# Patient Record
Sex: FEMALE | Race: OTHER | URBAN - METROPOLITAN AREA
[De-identification: names, ages, dates, MRNs, and addresses within clinical notes are randomized per-mention and may not be internally consistent; named-entity substitution may affect disease eponyms.]

---

## 2020-09-15 ENCOUNTER — INPATIENT (INPATIENT)
Facility: HOSPITAL | Age: 45
LOS: 2 days | Discharge: HOME | End: 2020-09-18
Attending: INTERNAL MEDICINE | Admitting: INTERNAL MEDICINE
Payer: COMMERCIAL

## 2020-09-15 VITALS
RESPIRATION RATE: 18 BRPM | TEMPERATURE: 98 F | OXYGEN SATURATION: 100 % | DIASTOLIC BLOOD PRESSURE: 57 MMHG | WEIGHT: 199.96 LBS | SYSTOLIC BLOOD PRESSURE: 121 MMHG | HEART RATE: 81 BPM

## 2020-09-15 LAB
ALBUMIN SERPL ELPH-MCNC: 4.4 G/DL — SIGNIFICANT CHANGE UP (ref 3.5–5.2)
ALP SERPL-CCNC: 47 U/L — SIGNIFICANT CHANGE UP (ref 30–115)
ALT FLD-CCNC: 10 U/L — SIGNIFICANT CHANGE UP (ref 0–41)
ANION GAP SERPL CALC-SCNC: 10 MMOL/L — SIGNIFICANT CHANGE UP (ref 7–14)
APPEARANCE UR: ABNORMAL
APTT BLD: 28.4 SEC — SIGNIFICANT CHANGE UP (ref 27–39.2)
AST SERPL-CCNC: 13 U/L — SIGNIFICANT CHANGE UP (ref 0–41)
BACTERIA # UR AUTO: ABNORMAL
BASOPHILS # BLD AUTO: 0.05 K/UL — SIGNIFICANT CHANGE UP (ref 0–0.2)
BASOPHILS NFR BLD AUTO: 0.8 % — SIGNIFICANT CHANGE UP (ref 0–1)
BILIRUB SERPL-MCNC: 0.3 MG/DL — SIGNIFICANT CHANGE UP (ref 0.2–1.2)
BILIRUB UR-MCNC: NEGATIVE — SIGNIFICANT CHANGE UP
BLD GP AB SCN SERPL QL: SIGNIFICANT CHANGE UP
BUN SERPL-MCNC: 13 MG/DL — SIGNIFICANT CHANGE UP (ref 10–20)
CALCIUM SERPL-MCNC: 9 MG/DL — SIGNIFICANT CHANGE UP (ref 8.5–10.1)
CHLORIDE SERPL-SCNC: 106 MMOL/L — SIGNIFICANT CHANGE UP (ref 98–110)
CO2 SERPL-SCNC: 26 MMOL/L — SIGNIFICANT CHANGE UP (ref 17–32)
COLOR SPEC: YELLOW — SIGNIFICANT CHANGE UP
CREAT SERPL-MCNC: 0.7 MG/DL — SIGNIFICANT CHANGE UP (ref 0.7–1.5)
DIFF PNL FLD: SIGNIFICANT CHANGE UP
EOSINOPHIL # BLD AUTO: 0.07 K/UL — SIGNIFICANT CHANGE UP (ref 0–0.7)
EOSINOPHIL NFR BLD AUTO: 1.1 % — SIGNIFICANT CHANGE UP (ref 0–8)
EPI CELLS # UR: 10 /HPF — HIGH (ref 0–5)
GLUCOSE SERPL-MCNC: 81 MG/DL — SIGNIFICANT CHANGE UP (ref 70–99)
GLUCOSE UR QL: NEGATIVE — SIGNIFICANT CHANGE UP
HCT VFR BLD CALC: 39.4 % — SIGNIFICANT CHANGE UP (ref 37–47)
HGB BLD-MCNC: 13 G/DL — SIGNIFICANT CHANGE UP (ref 12–16)
HYALINE CASTS # UR AUTO: 26 /LPF — HIGH (ref 0–7)
IMM GRANULOCYTES NFR BLD AUTO: 1.3 % — HIGH (ref 0.1–0.3)
INR BLD: 1.04 RATIO — SIGNIFICANT CHANGE UP (ref 0.65–1.3)
KETONES UR-MCNC: NEGATIVE — SIGNIFICANT CHANGE UP
LACTATE SERPL-SCNC: 1.1 MMOL/L — SIGNIFICANT CHANGE UP (ref 0.7–2)
LEUKOCYTE ESTERASE UR-ACNC: ABNORMAL
LIDOCAIN IGE QN: 38 U/L — SIGNIFICANT CHANGE UP (ref 7–60)
LYMPHOCYTES # BLD AUTO: 2.24 K/UL — SIGNIFICANT CHANGE UP (ref 1.2–3.4)
LYMPHOCYTES # BLD AUTO: 36.4 % — SIGNIFICANT CHANGE UP (ref 20.5–51.1)
MCHC RBC-ENTMCNC: 29.4 PG — SIGNIFICANT CHANGE UP (ref 27–31)
MCHC RBC-ENTMCNC: 33 G/DL — SIGNIFICANT CHANGE UP (ref 32–37)
MCV RBC AUTO: 89.1 FL — SIGNIFICANT CHANGE UP (ref 81–99)
MONOCYTES # BLD AUTO: 0.47 K/UL — SIGNIFICANT CHANGE UP (ref 0.1–0.6)
MONOCYTES NFR BLD AUTO: 7.6 % — SIGNIFICANT CHANGE UP (ref 1.7–9.3)
NEUTROPHILS # BLD AUTO: 3.25 K/UL — SIGNIFICANT CHANGE UP (ref 1.4–6.5)
NEUTROPHILS NFR BLD AUTO: 52.8 % — SIGNIFICANT CHANGE UP (ref 42.2–75.2)
NITRITE UR-MCNC: NEGATIVE — SIGNIFICANT CHANGE UP
NRBC # BLD: 0 /100 WBCS — SIGNIFICANT CHANGE UP (ref 0–0)
PH UR: 6.5 — SIGNIFICANT CHANGE UP (ref 5–8)
PLATELET # BLD AUTO: 253 K/UL — SIGNIFICANT CHANGE UP (ref 130–400)
POTASSIUM SERPL-MCNC: 3.8 MMOL/L — SIGNIFICANT CHANGE UP (ref 3.5–5)
POTASSIUM SERPL-SCNC: 3.8 MMOL/L — SIGNIFICANT CHANGE UP (ref 3.5–5)
PROT SERPL-MCNC: 6.6 G/DL — SIGNIFICANT CHANGE UP (ref 6–8)
PROT UR-MCNC: ABNORMAL
PROTHROM AB SERPL-ACNC: 12 SEC — SIGNIFICANT CHANGE UP (ref 9.95–12.87)
RBC # BLD: 4.42 M/UL — SIGNIFICANT CHANGE UP (ref 4.2–5.4)
RBC # FLD: 11.9 % — SIGNIFICANT CHANGE UP (ref 11.5–14.5)
RBC CASTS # UR COMP ASSIST: 13 /HPF — HIGH (ref 0–4)
SODIUM SERPL-SCNC: 142 MMOL/L — SIGNIFICANT CHANGE UP (ref 135–146)
SP GR SPEC: 1.03 — SIGNIFICANT CHANGE UP (ref 1.01–1.03)
UROBILINOGEN FLD QL: SIGNIFICANT CHANGE UP
WBC # BLD: 6.16 K/UL — SIGNIFICANT CHANGE UP (ref 4.8–10.8)
WBC # FLD AUTO: 6.16 K/UL — SIGNIFICANT CHANGE UP (ref 4.8–10.8)
WBC UR QL: 57 /HPF — HIGH (ref 0–5)

## 2020-09-15 PROCEDURE — 76705 ECHO EXAM OF ABDOMEN: CPT | Mod: 26

## 2020-09-15 PROCEDURE — 99285 EMERGENCY DEPT VISIT HI MDM: CPT

## 2020-09-15 PROCEDURE — 74177 CT ABD & PELVIS W/CONTRAST: CPT | Mod: 26

## 2020-09-15 RX ORDER — SODIUM CHLORIDE 9 MG/ML
1000 INJECTION, SOLUTION INTRAVENOUS ONCE
Refills: 0 | Status: COMPLETED | OUTPATIENT
Start: 2020-09-15 | End: 2020-09-15

## 2020-09-15 RX ORDER — ONDANSETRON 8 MG/1
4 TABLET, FILM COATED ORAL ONCE
Refills: 0 | Status: COMPLETED | OUTPATIENT
Start: 2020-09-15 | End: 2020-09-15

## 2020-09-15 RX ORDER — FAMOTIDINE 10 MG/ML
20 INJECTION INTRAVENOUS ONCE
Refills: 0 | Status: COMPLETED | OUTPATIENT
Start: 2020-09-15 | End: 2020-09-15

## 2020-09-15 RX ORDER — MORPHINE SULFATE 50 MG/1
4 CAPSULE, EXTENDED RELEASE ORAL ONCE
Refills: 0 | Status: DISCONTINUED | OUTPATIENT
Start: 2020-09-15 | End: 2020-09-15

## 2020-09-15 RX ORDER — SODIUM CHLORIDE 9 MG/ML
1000 INJECTION INTRAMUSCULAR; INTRAVENOUS; SUBCUTANEOUS ONCE
Refills: 0 | Status: COMPLETED | OUTPATIENT
Start: 2020-09-15 | End: 2020-09-15

## 2020-09-15 RX ORDER — PANTOPRAZOLE SODIUM 20 MG/1
40 TABLET, DELAYED RELEASE ORAL ONCE
Refills: 0 | Status: COMPLETED | OUTPATIENT
Start: 2020-09-15 | End: 2020-09-15

## 2020-09-15 RX ORDER — DIPHENHYDRAMINE HYDROCHLORIDE AND LIDOCAINE HYDROCHLORIDE AND ALUMINUM HYDROXIDE AND MAGNESIUM HYDRO
30 KIT ONCE
Refills: 0 | Status: COMPLETED | OUTPATIENT
Start: 2020-09-15 | End: 2020-09-15

## 2020-09-15 RX ADMIN — ONDANSETRON 4 MILLIGRAM(S): 8 TABLET, FILM COATED ORAL at 19:04

## 2020-09-15 RX ADMIN — DIPHENHYDRAMINE HYDROCHLORIDE AND LIDOCAINE HYDROCHLORIDE AND ALUMINUM HYDROXIDE AND MAGNESIUM HYDRO 30 MILLILITER(S): KIT at 19:04

## 2020-09-15 RX ADMIN — SODIUM CHLORIDE 1000 MILLILITER(S): 9 INJECTION INTRAMUSCULAR; INTRAVENOUS; SUBCUTANEOUS at 11:54

## 2020-09-15 RX ADMIN — FAMOTIDINE 20 MILLIGRAM(S): 10 INJECTION INTRAVENOUS at 18:05

## 2020-09-15 RX ADMIN — PANTOPRAZOLE SODIUM 40 MILLIGRAM(S): 20 TABLET, DELAYED RELEASE ORAL at 20:39

## 2020-09-15 RX ADMIN — MORPHINE SULFATE 4 MILLIGRAM(S): 50 CAPSULE, EXTENDED RELEASE ORAL at 12:23

## 2020-09-15 RX ADMIN — MORPHINE SULFATE 4 MILLIGRAM(S): 50 CAPSULE, EXTENDED RELEASE ORAL at 11:53

## 2020-09-15 RX ADMIN — SODIUM CHLORIDE 1000 MILLILITER(S): 9 INJECTION, SOLUTION INTRAVENOUS at 23:46

## 2020-09-15 RX ADMIN — ONDANSETRON 4 MILLIGRAM(S): 8 TABLET, FILM COATED ORAL at 11:52

## 2020-09-15 NOTE — ED PROVIDER NOTE - NS ED ROS FT
General: No fevers, chills, or malaise.  HEENT: No headache, visual changes, eye pain, hearing changes, ear pain, running nose, or sore throat.  Cardiac:  No chest pain, SOB, leg edema, or leg pain.  Respiratory:  No cough, respiratory distress, or hemoptysis.  GI:  Reports abdominal pain and diarrhea. No nausea, vomiting.  :  No dysuria, frequency, or urgency.  MS:  No myalgia, muscle weakness, joint pain or back pain.  Neuro:  No dizziness, LOC, paralysis, or N/T.  Skin:  No skin rash.   Endocrine: No polyuria, polyphagia, or polydipsia.

## 2020-09-15 NOTE — ED ADULT NURSE REASSESSMENT NOTE - NS ED NURSE REASSESS COMMENT FT1
Pt. assessed. Pt. alert and responsive to tactile and verbal stimuli, oriented to person time place and situation. Pt. voices some relief of abdominal pain. Plan of care discussed with pt. IV access patent and intact. Safety maintained. Will continue to monitor. Pt. assessed. Pt. alert and responsive to tactile and verbal stimuli, oriented to person time place and situation. Pt. voices some relief of abdominal pain. Plan of care discussed with pt. IV access patent and intact. Pt to begin PO trial. Safety maintained. Will continue to monitor.

## 2020-09-15 NOTE — ED ADULT TRIAGE NOTE - CHIEF COMPLAINT QUOTE
pt having mid abdominal pain since thursday. medications being changed for her lupus. pt had diarrhea yesterday. no fever.

## 2020-09-15 NOTE — ED PROVIDER NOTE - OBJECTIVE STATEMENT
44 y.o. female w/ PMH of acute pancreatitis hospitalized at [facility] 8/3, Lupus, and hypothyroidism presents for epigastric abdominal pain x 6 days.  States that last month, pt was experiencing similar pain which prompted her visit to [facility].  Was diagnosed with pancreatitis that was thought to be secondary to her taking azathioprine and medication regiment was switched to predisnone.  States that for past 6 days has been having similar epigastric abdominal pain described as sharp pain with radiation to the back, worse with eating/drinking.  Denies fevers, N/V, and urinary sxs.  Reports few episodes of mild diarrhea.

## 2020-09-15 NOTE — ED PROVIDER NOTE - CLINICAL SUMMARY MEDICAL DECISION MAKING FREE TEXT BOX
pt s/o to me from Dr. Randolph, h/o pancreatitis in ER with c/o epigastgric pain.  pt aslo with h/o lupus, recently had prednisone dose increased.  labs ok, lfts and lipase neg.  RUQ sono and CT abd with no acute pathology.  pt was given zofran, pepcid, morphine, hurricane solution. pt re-eval and still having pain, worsens when she attempts to take PO, unable to tolerate PO in ER.  will admit for gi eval, IVF.

## 2020-09-15 NOTE — ED PROVIDER NOTE - PHYSICAL EXAMINATION
CONSTITUTIONAL: Mild distress  SKIN: warm, dry  HEAD: Normocephalic; atraumatic.  EYES: PERRLA, EOMI, no conjunctival erythema  CARD: S1, S2 normal; no murmurs, gallops, or rubs. Regular rate and rhythm.   RESP: No wheezes, rales or rhonchi.  ABD: Epigastric and RUQ TTP.  Negative gramajo sign.  Abdomen soft and nondistended.  No rebound tenderness.   BACK: No CVA tenderness  NEURO: Alert, oriented, grossly unremarkable  PSYCH: Cooperative, appropriate.

## 2020-09-15 NOTE — ED PROVIDER NOTE - ATTENDING CONTRIBUTION TO CARE
43yo F with PMHx lupus, hypothyroidism, recent admission for acute pancreatitis ([facility]), presents for epigastric/LUQ pain for 5 days, sharp, radiating to back, constant, worse with PO intake. Associated with nausea and intermittent nonbloody diarrhea. Pt states pancreatitis attributed to azathioprine which she was taking for lupus, that med was discontinued and she was started on steroids instead, recently 20mg prednisone increased to 40mg. Denies ETOH use. No h/o gallstones. Denies fever, chills, lightheadedness, CP, SOB, cough, palpitations, vomiting, dysuria, hematuria, rash.     Vital signs reviewed  GENERAL: Patient nontoxic appearing, NAD  HEAD: NCAT  EYES: Anicteric  ENT: MMM  RESPIRATORY: Normal respiratory effort. CTA B/L. No wheezing, rales, rhonchi  CARDIOVASCULAR: Regular rate and rhythm  ABDOMEN: Soft. Nondistended. Epigastric/LUQ mild-moderate TTP. No guarding or rebound. No CVA tenderness  MUSCULOSKELETAL/EXTREMITIES: Brisk cap refill. Equal radial pulses. No leg edema.  SKIN:  Warm and dry  NEURO: AAOx3. No gross FND.

## 2020-09-16 DIAGNOSIS — Z90.49 ACQUIRED ABSENCE OF OTHER SPECIFIED PARTS OF DIGESTIVE TRACT: Chronic | ICD-10-CM

## 2020-09-16 LAB
ALBUMIN SERPL ELPH-MCNC: 3.5 G/DL — SIGNIFICANT CHANGE UP (ref 3.5–5.2)
ALP SERPL-CCNC: 43 U/L — SIGNIFICANT CHANGE UP (ref 30–115)
ALT FLD-CCNC: 12 U/L — SIGNIFICANT CHANGE UP (ref 0–41)
ANION GAP SERPL CALC-SCNC: 7 MMOL/L — SIGNIFICANT CHANGE UP (ref 7–14)
AST SERPL-CCNC: 16 U/L — SIGNIFICANT CHANGE UP (ref 0–41)
BASOPHILS # BLD AUTO: 0.04 K/UL — SIGNIFICANT CHANGE UP (ref 0–0.2)
BASOPHILS NFR BLD AUTO: 0.7 % — SIGNIFICANT CHANGE UP (ref 0–1)
BILIRUB SERPL-MCNC: 0.3 MG/DL — SIGNIFICANT CHANGE UP (ref 0.2–1.2)
BUN SERPL-MCNC: 9 MG/DL — LOW (ref 10–20)
CALCIUM SERPL-MCNC: 8 MG/DL — LOW (ref 8.5–10.1)
CHLORIDE SERPL-SCNC: 104 MMOL/L — SIGNIFICANT CHANGE UP (ref 98–110)
CO2 SERPL-SCNC: 26 MMOL/L — SIGNIFICANT CHANGE UP (ref 17–32)
CREAT SERPL-MCNC: 0.7 MG/DL — SIGNIFICANT CHANGE UP (ref 0.7–1.5)
EOSINOPHIL # BLD AUTO: 0.09 K/UL — SIGNIFICANT CHANGE UP (ref 0–0.7)
EOSINOPHIL NFR BLD AUTO: 1.5 % — SIGNIFICANT CHANGE UP (ref 0–8)
GLUCOSE SERPL-MCNC: 75 MG/DL — SIGNIFICANT CHANGE UP (ref 70–99)
HCT VFR BLD CALC: 36.5 % — LOW (ref 37–47)
HGB BLD-MCNC: 12 G/DL — SIGNIFICANT CHANGE UP (ref 12–16)
IMM GRANULOCYTES NFR BLD AUTO: 0.8 % — HIGH (ref 0.1–0.3)
LYMPHOCYTES # BLD AUTO: 2.08 K/UL — SIGNIFICANT CHANGE UP (ref 1.2–3.4)
LYMPHOCYTES # BLD AUTO: 34.1 % — SIGNIFICANT CHANGE UP (ref 20.5–51.1)
MAGNESIUM SERPL-MCNC: 2 MG/DL — SIGNIFICANT CHANGE UP (ref 1.8–2.4)
MCHC RBC-ENTMCNC: 29.3 PG — SIGNIFICANT CHANGE UP (ref 27–31)
MCHC RBC-ENTMCNC: 32.9 G/DL — SIGNIFICANT CHANGE UP (ref 32–37)
MCV RBC AUTO: 89 FL — SIGNIFICANT CHANGE UP (ref 81–99)
MONOCYTES # BLD AUTO: 0.42 K/UL — SIGNIFICANT CHANGE UP (ref 0.1–0.6)
MONOCYTES NFR BLD AUTO: 6.9 % — SIGNIFICANT CHANGE UP (ref 1.7–9.3)
NEUTROPHILS # BLD AUTO: 3.42 K/UL — SIGNIFICANT CHANGE UP (ref 1.4–6.5)
NEUTROPHILS NFR BLD AUTO: 56 % — SIGNIFICANT CHANGE UP (ref 42.2–75.2)
NRBC # BLD: 0 /100 WBCS — SIGNIFICANT CHANGE UP (ref 0–0)
PLATELET # BLD AUTO: 211 K/UL — SIGNIFICANT CHANGE UP (ref 130–400)
POTASSIUM SERPL-MCNC: 3.6 MMOL/L — SIGNIFICANT CHANGE UP (ref 3.5–5)
POTASSIUM SERPL-SCNC: 3.6 MMOL/L — SIGNIFICANT CHANGE UP (ref 3.5–5)
PROT SERPL-MCNC: 5.6 G/DL — LOW (ref 6–8)
RBC # BLD: 4.1 M/UL — LOW (ref 4.2–5.4)
RBC # FLD: 11.9 % — SIGNIFICANT CHANGE UP (ref 11.5–14.5)
SARS-COV-2 RNA SPEC QL NAA+PROBE: SIGNIFICANT CHANGE UP
SODIUM SERPL-SCNC: 137 MMOL/L — SIGNIFICANT CHANGE UP (ref 135–146)
WBC # BLD: 6.1 K/UL — SIGNIFICANT CHANGE UP (ref 4.8–10.8)
WBC # FLD AUTO: 6.1 K/UL — SIGNIFICANT CHANGE UP (ref 4.8–10.8)

## 2020-09-16 PROCEDURE — 99222 1ST HOSP IP/OBS MODERATE 55: CPT

## 2020-09-16 PROCEDURE — 99223 1ST HOSP IP/OBS HIGH 75: CPT

## 2020-09-16 RX ORDER — HYDROXYCHLOROQUINE SULFATE 200 MG
200 TABLET ORAL
Refills: 0 | Status: DISCONTINUED | OUTPATIENT
Start: 2020-09-16 | End: 2020-09-18

## 2020-09-16 RX ORDER — PANTOPRAZOLE SODIUM 20 MG/1
40 TABLET, DELAYED RELEASE ORAL EVERY 12 HOURS
Refills: 0 | Status: DISCONTINUED | OUTPATIENT
Start: 2020-09-16 | End: 2020-09-18

## 2020-09-16 RX ORDER — SODIUM CHLORIDE 9 MG/ML
1000 INJECTION, SOLUTION INTRAVENOUS
Refills: 0 | Status: DISCONTINUED | OUTPATIENT
Start: 2020-09-16 | End: 2020-09-18

## 2020-09-16 RX ORDER — LEVOTHYROXINE SODIUM 125 MCG
1 TABLET ORAL
Qty: 0 | Refills: 0 | DISCHARGE

## 2020-09-16 RX ORDER — ONDANSETRON 8 MG/1
1 TABLET, FILM COATED ORAL
Qty: 0 | Refills: 0 | DISCHARGE

## 2020-09-16 RX ORDER — PHENTERMINE HCL 30 MG
1 CAPSULE ORAL
Qty: 0 | Refills: 0 | DISCHARGE

## 2020-09-16 RX ORDER — CHOLECALCIFEROL (VITAMIN D3) 125 MCG
1000 CAPSULE ORAL DAILY
Refills: 0 | Status: DISCONTINUED | OUTPATIENT
Start: 2020-09-16 | End: 2020-09-16

## 2020-09-16 RX ORDER — MORPHINE SULFATE 50 MG/1
2 CAPSULE, EXTENDED RELEASE ORAL EVERY 6 HOURS
Refills: 0 | Status: DISCONTINUED | OUTPATIENT
Start: 2020-09-16 | End: 2020-09-17

## 2020-09-16 RX ORDER — CHOLECALCIFEROL (VITAMIN D3) 125 MCG
0 CAPSULE ORAL
Qty: 0 | Refills: 0 | DISCHARGE

## 2020-09-16 RX ORDER — HYDROXYCHLOROQUINE SULFATE 200 MG
0 TABLET ORAL
Qty: 0 | Refills: 0 | DISCHARGE

## 2020-09-16 RX ORDER — LEVOTHYROXINE SODIUM 125 MCG
100 TABLET ORAL DAILY
Refills: 0 | Status: DISCONTINUED | OUTPATIENT
Start: 2020-09-16 | End: 2020-09-18

## 2020-09-16 RX ORDER — HEPARIN SODIUM 5000 [USP'U]/ML
5000 INJECTION INTRAVENOUS; SUBCUTANEOUS EVERY 12 HOURS
Refills: 0 | Status: DISCONTINUED | OUTPATIENT
Start: 2020-09-16 | End: 2020-09-18

## 2020-09-16 RX ADMIN — SODIUM CHLORIDE 100 MILLILITER(S): 9 INJECTION, SOLUTION INTRAVENOUS at 17:34

## 2020-09-16 RX ADMIN — HEPARIN SODIUM 5000 UNIT(S): 5000 INJECTION INTRAVENOUS; SUBCUTANEOUS at 06:13

## 2020-09-16 RX ADMIN — PANTOPRAZOLE SODIUM 40 MILLIGRAM(S): 20 TABLET, DELAYED RELEASE ORAL at 17:29

## 2020-09-16 RX ADMIN — SODIUM CHLORIDE 100 MILLILITER(S): 9 INJECTION, SOLUTION INTRAVENOUS at 06:12

## 2020-09-16 RX ADMIN — Medication 200 MILLIGRAM(S): at 17:29

## 2020-09-16 RX ADMIN — MORPHINE SULFATE 2 MILLIGRAM(S): 50 CAPSULE, EXTENDED RELEASE ORAL at 17:29

## 2020-09-16 RX ADMIN — Medication 200 MILLIGRAM(S): at 06:13

## 2020-09-16 RX ADMIN — Medication 40 MILLIGRAM(S): at 06:13

## 2020-09-16 RX ADMIN — PANTOPRAZOLE SODIUM 40 MILLIGRAM(S): 20 TABLET, DELAYED RELEASE ORAL at 06:13

## 2020-09-16 RX ADMIN — Medication 100 MICROGRAM(S): at 06:13

## 2020-09-16 NOTE — H&P ADULT - NSHPREVIEWOFSYSTEMS_GEN_ALL_CORE
REVIEW OF SYSTEMS:    CONSTITUTIONAL: No weakness, fevers or chills  EYES/ENT: No visual changes;  No vertigo or throat pain   NECK: No pain or stiffness  RESPIRATORY: No cough, wheezing, hemoptysis; No shortness of breath  CARDIOVASCULAR: No chest pain or palpitations  GASTROINTESTINAL: + epigastric pain. +nausea, no vomiting, or hematemesis; No diarrhea or constipation. No melena or hematochezia.  GENITOURINARY: No dysuria, frequency or hematuria  NEUROLOGICAL: No numbness or weakness  SKIN: No itching, rashes

## 2020-09-16 NOTE — H&P ADULT - ATTENDING COMMENTS
pt seen and examined independently.   has severe epigastric pain. cannot tolerate diet, including liquids due to worsening of pain. no gib  dw gi  keep npo, poss egd.   dw Dr Carvalho , ok to decrease steroids , will decrease to 20, and in 2-3 days she can be decreased to 10.   full code pt seen and examined independently.   has severe epigastric pain. cannot tolerate diet, including liquids due to worsening of pain. no gib  does not take the listed meds above, only takes synthroid, prednisone and Plaquenil   a/p  epigastric pain, not pancreatitis. probably steroid induced gastritis, rule out PUD.   dw gi  keep npo, poss egd.   damian Carvalho , ok to decrease steroids , will decrease to 20, and in 2-3 days she can be decreased to 10.   full code

## 2020-09-16 NOTE — H&P ADULT - NSHPOUTPATIENTPROVIDERS_GEN_ALL_CORE
Rheumatology: Aurelia Carvalho [address] [facility] [place]  GI: Naveen Rob [address] [place] [address]

## 2020-09-16 NOTE — H&P ADULT - NSHPPHYSICALEXAM_GEN_ALL_CORE
PHYSICAL EXAM:  GENERAL: NAD, lying in bed comfortably  HEAD:  Atraumatic, Normocephalic  CHEST/LUNG: Clear to auscultation bilaterally; No rales, rhonchi, wheezing, or rubs. Unlabored respirations  HEART: Regular rate and rhythm; No murmurs, rubs, or gallops  ABDOMEN: Bowel sounds present; Soft, Nontender, Nondistended. epigastric tenderness   EXTREMITIES:  2+ Peripheral Pulses, brisk capillary refill. No clubbing, cyanosis, or edema  NERVOUS SYSTEM:  Alert & Oriented X3, speech clear. No deficits   MSK: FROM all 4 extremities, full and equal strength  SKIN: No rashes or lesions

## 2020-09-16 NOTE — H&P ADULT - ASSESSMENT
Mrs. Morales is a 44 year old F with a PMHx significant for SLE and hypothyroidism presents for a 5 day history of worsening abdominal pain.  On Thursday, 9/10/2020, patient reported excruciating burning, sharp-like, pain localized to her epigastric area that radiated to her left flank pain.    #Possible GERD vs. PUD 2/2 Glucocorticoids   Patient vitally stable on admission   CBC and CMP unremarkable   Lipase 38   CTAP negative for acute intraabdominal process, no radiographic evidence of acute pancreatitis   AZT discontinued as it can cause pancreatitis and patient was placed on prednisone at end of August   H pylori stool Ag ordered  Protonix 40mg PO BID  Will start LR 100cc/hour   CLD for now   GI consulted     #History of Acute Pancreatitis  Previous Lipase>2000 at [facility] and 127 on outpatient labs on 8/14/2020   Lipase 38 on this admission  CTAP negative for acute pancreatitis   Low suspicion  for acute pancreatitis on this admission     #SLE  c/w prednisone 40mg PO daily  c/w HCQ 200mg PO BID     #Hypothyroidism   c/w synthroid 100mcg PO daily    DVT ppx: Heparin q12   Activity: AAT   Diet: CLD  Dispo: Acute  Full Code

## 2020-09-16 NOTE — CONSULT NOTE ADULT - ASSESSMENT
Mrs. Morales is a 44 year old F with a PMHx significant for SLE and hypothyroidism presents for a 6 days history of worsening abdominal pain.  On Thursday, 9/10/2020, patient reported excruciating burning, sharp pain localized to her epigastric area that radiated to the left side and back.      Mrs. Morlaes is a 44 year old F with a PMHx significant for SLE and hypothyroidism presents for a 6 days history of worsening abdominal pain.  On Thursday, 9/10/2020, patient reported excruciating burning, sharp pain localized to her epigastric area that radiated to the left side and back.     Abdominal pain/ Dyspepsia  GERD/PUD  - Normal LFTs  - Hemodynamically stable     Plan   - Plan for Endoscopy 09/17/2020  - PPI             Mrs. Morales is a 44 year old F with a PMHx significant for SLE and hypothyroidism presents for a 6 days history of worsening abdominal pain.  On Thursday, 9/10/2020, patient reported excruciating burning, sharp pain localized to her epigastric area that radiated to the left side and back.     Abdominal pain/ Dyspepsia  GERD/PUD  - Normal LFTs  - Hemodynamically stable     Plan   - Plan for Endoscopy 09/17/2020  - PPI              Mrs. Morales is a 44 year old F with a PMHx significant for SLE and hypothyroidism presents for a 6 days history of worsening abdominal pain.  On Thursday, 9/10/2020, patient reported excruciating burning, sharp pain localized to her epigastric area that radiated to the left side and back.     Abdominal pain/ Dyspepsia  GERD/PUD  - Normal LFTs  - Hemodynamically stable   - CBC and CMP unremarkable   - Lipase 38   - CT Abdomen and Pelvis w/ IV Cont: Hepatic steatosis. No CT evidence of acute abdominopelvic pathology. 3.7 cm unilocular left adnexal cyst. No radiographic evidence of acute pancreatitis   - US abdomen: Unremarkable right upper quadrant abdominal ultrasound.      Plan   - Plan for Endoscopy on 09/17/2020  - PPI   - Normal diet   - Pain control                Mrs. Morales is a 44 year old F with a PMHx significant for acute  pancreatitis, SLE and hypothyroidism presents for a 6 days history of worsening abdominal pain.  On Thursday, 9/10/2020, patient reported excruciating burning, sharp pain localized to her epigastric area that radiated to the left side and back.     Abdominal pain/ Dyspepsia  GERD/PUD  - Normal LFTs  - Hemodynamically stable   - CBC and CMP unremarkable   - Lipase 38   - CT Abdomen and Pelvis w/ IV Cont: Hepatic steatosis. No CT evidence of acute abdominopelvic pathology. 3.7 cm unilocular left adnexal cyst. No radiographic evidence of acute pancreatitis   - US abdomen: Unremarkable right upper quadrant abdominal ultrasound. Normal CBD      Plan   - Plan for Endoscopy on 09/17/2020  - PPI bid  - NPO   - Pain control

## 2020-09-16 NOTE — H&P ADULT - HISTORY OF PRESENT ILLNESS
Mrs. Morales is a 44 year old F with a PMHx significant for SLE and hypothyroidism presents for a 5 day history of worsening abdominal pain.  On Thursday, 9/10/2020, patient reported excruciating burning, sharp-like, pain localized to her epigastric area that radiated to her left flank pain.  She said that 30 min prior to the pain onset, she had chicken [employer].  She said alleviating factors and worsening factor comes with avoidance of food and eating respectively.  She had a previous episode of this pain, in which she was hospitalized at [facility], where they diagnosed her with acute pancreatitis (8/3/2020-8/8/2020), where she was treated with IV fluids and was subsequently discharged.  Her rheumatologist thought her pancreatitis may have been attributable to her underyling lupus and started her on prednisone at around the end of August.  Patient reported alleviation of pain prior to presentation. Mrs. Morales is a 44 year old F with a PMHx significant for SLE and hypothyroidism presents for a 5 day history of worsening abdominal pain.  On Thursday, 9/10/2020, patient reported excruciating burning, sharp-like, pain localized to her epigastric area that radiated to her left flank pain.  She said that 30 min prior to the pain onset, she had chicken franchisee.  She said alleviating factors and worsening factor comes with avoidance of food and eating respectively.  She had a previous episode of this pain, in which she was hospitalized at [facility], where they diagnosed her with acute pancreatitis (8/3/2020-8/8/2020), where she was treated with IV fluids and was subsequently discharged.  Her lipase at [facility] was over 2000 as per patient (records from [facility] did not have a recorded lipase). Outpatient labs on  8/14/2020 showed a lipase of 127.  Her rheumatologist thought her pancreatitis may have been attributable to AZT and started her on prednisone at around the end of August and discontinued AZY.  Patient reported alleviation of pain prior to presentation when she started her steroids. She endorsed two episodes of non-bloody, watery diarrhea.  She did not endorse chest pain, sob, n/v, HA, dizziness, fevers, rigors.     In the ED: Temp 98, /57, HR 81.  CBC and CMP unremarkable.  CTAP negative for pancreatitis, demonstrated a 3.7cm left adnexal cyst and hepatic steatosis.

## 2020-09-16 NOTE — CONSULT NOTE ADULT - SUBJECTIVE AND OBJECTIVE BOX
Gastroenterology Consultation:    Patient is a 44y old  Female who presents with a chief complaint of Abdominal Pain (16 Sep 2020 01:43)      Admitted on: 09-15-20  HPI:  Mrs. Morales is a 44 year old F with a PMHx significant for SLE and hypothyroidism presents for a 6 days history of worsening abdominal pain.  On Thursday, 9/10/2020, patient reported excruciating burning, sharp pain localized to her epigastric area that radiated to the left side and back.  She said that 30 min prior to the pain onset, she had [employer].  Pain is constant and gets worse after food. She said alleviating factors and worsening factor comes with avoidance of food and eating respectively.  Currently her pain is 7/10 on pain scale, however after food pain is 10/10 on pain scale. She had a previous episode of this pain, in which she was hospitalized at [facility], where they diagnosed her with acute pancreatitis (8/3/2020-8/8/2020), where she was treated with IV fluids and was subsequently discharged.  Her lipase at [facility] was over 2000 as per patient (records from [facility] did not have a recorded lipase). Outpatient labs show a lipase level trending down.  Her rheumatologist thought her pancreatitis may have been attributable to AZT and started her on prednisone at around the end of August and discontinued AZY, however according to the patient she was later told by her rheumatologist that her pancreatitis was related to SLE.  Patient reported alleviation of pain prior to presentation when she started her steroids. She endorsed two episodes of non-bloody, watery diarrhea.  Pt endorses mild nausea, 1 episode of bilious vomiting on Thursday. Pt's last BM was on Monday and denies constipation. Pt endorses foul smelling dark urine with burning sensation while urinating.  Pt has history is multiple kidney stones, S/P appendectomy, and multiple gynecological surgeries in the past. Pt's FMHx is significant for Crohn's disease and diverticulitis in her mother, and ?gastric cancer in her father.   Patient was seen by multiple of GI doctors in the past. Pt is currently seeing Dr. Rob and previously she was seeing Dr. Milton Parikh.   Pt's vitals are normal, in the ED: Temp 98, /57, HR 81.  CBC and CMP unremarkable. Lipase is 38, TPro  5.6<L>  /  Alb  3.5  /  TBili  0.3  /  DBili  x   /  AST  16  /  ALT  12  /  AlkPhos  43.  CTAP negative for pancreatitis, demonstrated a 3.7cm left adnexal cyst and hepatic steatosis.        Prior records Reviewed (Y/N): Y  History obtained from person other than patient (Y/N): N    Prior EGD: Yes  Prior Colonoscopy: Yes       PAST MEDICAL & SURGICAL HISTORY:  Hypothyroidism    SLE (systemic lupus erythematosus)    S/P appendectomy        FAMILY HISTORY:      Social History:   Tobacco: none  Alcohol: none  Drugs: none    Home Medications:  cefdinir 300 mg oral capsule: 1 cap(s) orally every 12 hours (16 Sep 2020 01:34)  dicyclomine 20 mg oral tablet: 1 tab(s) orally 3 times a day (16 Sep 2020 01:33)  Fioricet oral capsule: 1 cap(s) orally every 4 hours (16 Sep 2020 01:36)  phentermine 37.5 mg oral capsule: 1 cap(s) orally once a day (16 Sep 2020 01:35)  Plaquenil 200 mg oral tablet: orally 2 times a day (16 Sep 2020 01:36)  predniSONE 20 mg oral tablet: 2 tab(s) orally once a day (16 Sep 2020 01:33)  Synthroid 100 mcg (0.1 mg) oral tablet: 1 tab(s) orally once a day (16 Sep 2020 01:34)  Vitamin D3 2000 intl units (50 mcg) oral capsule: orally once a day (16 Sep 2020 01:35)  Zofran 4 mg oral tablet: 1 tab(s) orally every 6 hours  PRN (16 Sep 2020 01:38)    MEDICATIONS  (STANDING):  heparin   Injectable 5000 Unit(s) SubCutaneous every 12 hours  hydroxychloroquine 200 milliGRAM(s) Oral two times a day  lactated ringers. 1000 milliLiter(s) (100 mL/Hr) IV Continuous <Continuous>  levothyroxine 100 MICROGram(s) Oral daily  pantoprazole    Tablet 40 milliGRAM(s) Oral every 12 hours  predniSONE   Tablet 20 milliGRAM(s) Oral daily    MEDICATIONS  (PRN):      Allergies  Coconut       Review of Systems:   Constitutional:  No Fever, No Chills  ENT/Mouth:  No Hearing Changes,  No Difficulty Swallowing  Eyes:  No Eye Pain, No Vision Changes  Cardiovascular:  No Chest Pain, No Palpitations  Respiratory:  No Cough, No Dyspnea  Gastrointestinal:  As described in HPI  Musculoskeletal:  No Joint Swelling, No Back Pain  Skin:  No Skin Lesions, No Jaundice  Neuro:  No Syncope, No Dizziness  Heme/Lymph:  No Bruising, No Bleeding.          Physical Examination:  T(C): 37.1 (09-16-20 @ 07:32), Max: 37.1 (09-16-20 @ 07:32)  HR: 58 (09-16-20 @ 07:32) (58 - 67)  BP: 96/53 (09-16-20 @ 07:32) (93/51 - 98/56)  RR: 16 (09-16-20 @ 07:32) (16 - 18)  SpO2: 98% (09-16-20 @ 07:32) (97% - 98%)        Constitutional: No acute distress.  Eyes:. Conjunctivae are clear, Sclera is non-icteric.  Ears Nose and Throat: The external ears are normal appearing,  Oral mucosa is pink and moist.  Respiratory:  No signs of respiratory distress. Lung sounds are clear bilaterally.  Cardiovascular:  S1 S2, Regular rate and rhythm.  GI: Abdomen is soft, symmetric, and +tenderness to palpation the epigastrium without distention. There are no visible lesions. Bowel sounds are present and normoactive in all four quadrants. No masses, hepatomegaly, or splenomegaly are noted.   Neuro: No Tremor, No involuntary movements  Skin: No rashes, No Jaundice.          Data: (reviewed by attending)                        12.0   6.10  )-----------( 211      ( 16 Sep 2020 05:40 )             36.5     Hgb Trend:  12.0  09-16-20 @ 05:40  13.0  09-15-20 @ 12:15      09-16    137  |  104  |  9<L>  ----------------------------<  75  3.6   |  26  |  0.7    Ca    8.0<L>      16 Sep 2020 05:40  Mg     2.0     09-16    TPro  5.6<L>  /  Alb  3.5  /  TBili  0.3  /  DBili  x   /  AST  16  /  ALT  12  /  AlkPhos  43  09-16    Liver panel trend:  TBili 0.3   /   AST 16   /   ALT 12   /   AlkP 43   /   Tptn 5.6   /   Alb 3.5    /   DBili --      09-16  TBili 0.3   /   AST 13   /   ALT 10   /   AlkP 47   /   Tptn 6.6   /   Alb 4.4    /   DBili --      09-15      PT/INR - ( 15 Sep 2020 12:15 )   PT: 12.00 sec;   INR: 1.04 ratio         PTT - ( 15 Sep 2020 12:15 )  PTT:28.4 sec        Radiology:(reviewed by attending)  CT Abdomen and Pelvis w/ IV Cont:   EXAM:  CT ABDOMEN AND PELVIS IC            PROCEDURE DATE:  09/15/2020            INTERPRETATION:  CLINICAL STATEMENT: Abdominal pain, history of pancreatitis.    TECHNIQUE: Contiguous axial CT images were obtained from the lower chest to the pubicsymphysis following administration of 100cc Omnipaque 350 intravenous contrast.  Oral contrast was not administered.  Reformatted images in the coronal and sagittal planes were acquired.    COMPARISON: Same day right upper quadrant abdominal ultrasound.        FINDINGS:    LOWER CHEST: Dependent atelectasis.    HEPATOBILIARY: Hepatic steatosis. No biliary ductal dilatation. Unremarkable CT appearance of the gallbladder.    SPLEEN: Unremarkable.    PANCREAS: Unremarkable.    ADRENAL GLANDS: Unremarkable.    KIDNEYS: Unremarkable.    ABDOMINOPELVIC NODES: Unremarkable.    PELVIC ORGANS: 3.7 cm unilocular left adnexal cyst. IUD noted in the uterus. Collapsed bladder, limiting evaluation.    PERITONEUM/MESENTERY/BOWEL: No evidence of bowel obstruction, free air, or ascites. Appendix not visualized. No focal inflammatory changes in the right lower quadrant.    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 Mild degenerative changes of the spine.      IMPRESSION:    1.  No CT evidence of acute abdominopelvic pathology.  2.  3.7 cm unilocular left adnexal cyst.  3.  Other findings in the body of the report.            MACHELLE HOLT M.D., RESIDENT RADIOLOGIST  This document has been electronically signed.  ROSA HENSLEY M.D., ATTENDING RADIOLOGIST  This document has been electronically signed. Sep 15 2020  4:29PM (09-15-20 @ 15:25)    US Abdomen Limited:   EXAM:  US ABDOMEN LIMITED            PROCEDURE DATE:  09/15/2020            INTERPRETATION:  CLINICAL INFORMATION: Right upper quadrant pain.    COMPARISON: None available.    TECHNIQUE: Sonography of the right upper quadrant.    FINDINGS:    Liver: Within normal limits.  Bile ducts: Normal caliber. Common bile duct measures 3 mm.  Gallbladder: No cholelithiasis. No pericholecystic fluid or gallbladder wall thickening. Negative sonographic Aly sign.  Pancreas: Visualized portions are within normal limits.  Right kidney: 10.1 cm. Vascular flow demonstrated. No hydronephrosis or calculi.  Ascites: None.  Aorta/IVC: Visualized portions are within normal limits.    IMPRESSION:    Unremarkable right upper quadrant abdominal ultrasound.              DANI MCINTOSH M.D., RESIDENT RADIOLOGIST  This document has been electronically signed.  JIMMIE ANTOINE M.D., ATTENDING RADIOLOGIST  This document has been electronically signed. Sep 15 2020 12:32PM (09-15-20 @ 12:23)     Gastroenterology Consultation:    Patient is a 44y old  Female who presents with a chief complaint of Abdominal Pain (16 Sep 2020 01:43)      Admitted on: 09-15-20  HPI:  Mrs. Morales is a 44 year old F with a PMHx significant for SLE and hypothyroidism presents for a 6 days history of worsening abdominal pain.  On Thursday, 9/10/2020, patient reported excruciating burning, sharp pain localized to her epigastric area that radiated to the left side and back.  She said that 30 min prior to the pain onset, she had [employer].  Pain is constant and gets worse after food. She said alleviating factors and worsening factor comes with avoidance of food and eating respectively.  Currently her pain is 7/10 on pain scale, however after food pain is 10/10 on pain scale. She had a previous episode of this pain, in which she was hospitalized at [facility], where they diagnosed her with acute pancreatitis (8/3/2020-8/8/2020), where she was treated with IV fluids and was subsequently discharged.  Her lipase at [facility] was over 2000 as per patient (records from [facility] did not have a recorded lipase). Outpatient labs show lipase level of 70 on 09/10/2020.  Her rheumatologist thought her pancreatitis may have been attributable to AZT and was discontinued, however Pt was later diagnosed with SLE induced pancreatitis by her rheumatologist and GI. Pt was started her on prednisone and reported alleviation of pain prior to presentation when she started her steroids. She endorsed two episodes of non-bloody, watery diarrhea.  Pt's last BM was on Monday and denies constipation. Pt endorses mild nausea, 1 episode of bilious vomiting on Thursday. Pt endorse acid reflux and sour taste in her mouth. Pt endorses foul smelling dark urine with burning sensation with urinating. Pt admits to history of dysphagia couple of months ago to both solids and liquids, and denies odynophagia.  Pt has history is multiple kidney stones, S/P appendectomy, and multiple gynecological surgeries in the past.     Patient was seen by multiple of GI doctors in the past. Pt is currently seeing Dr. Rob and previously she was seeing Dr. Milton Parikh.     Pt's vitals are normal, in the ED: Temp 98, /57, HR 81.  CBC and CMP unremarkable. Lipase is 38, TPro  5.6<L>  /  Alb  3.5  /  TBili  0.3  /  DBili  x   /  AST  16  /  ALT  12  /  AlkPhos  43.  CTAP negative for pancreatitis, demonstrated a 3.7cm left adnexal cyst and hepatic steatosis.        Prior records Reviewed (Y/N): Y  History obtained from person other than patient (Y/N): N    Prior EGD: Yes  Prior Colonoscopy: Yes       PAST MEDICAL & SURGICAL HISTORY:  Hypothyroidism    SLE (systemic lupus erythematosus)    S/P appendectomy        FAMILY HISTORY:  Pt's FMHx is significant for Crohn's disease and diverticulitis in her mother  ?gastric cancer in her father.   Family history of Alcohol Abuse (Brother)  Family history of DM     Social History:   Tobacco: none  Alcohol: none  Drugs: none    Home Medications:  cefdinir 300 mg oral capsule: 1 cap(s) orally every 12 hours (16 Sep 2020 01:34)  dicyclomine 20 mg oral tablet: 1 tab(s) orally 3 times a day (16 Sep 2020 01:33)  Fioricet oral capsule: 1 cap(s) orally every 4 hours (16 Sep 2020 01:36)  phentermine 37.5 mg oral capsule: 1 cap(s) orally once a day (16 Sep 2020 01:35)  Plaquenil 200 mg oral tablet: orally 2 times a day (16 Sep 2020 01:36)  predniSONE 20 mg oral tablet: 2 tab(s) orally once a day (16 Sep 2020 01:33)  Synthroid 100 mcg (0.1 mg) oral tablet: 1 tab(s) orally once a day (16 Sep 2020 01:34)  Vitamin D3 2000 intl units (50 mcg) oral capsule: orally once a day (16 Sep 2020 01:35)  Zofran 4 mg oral tablet: 1 tab(s) orally every 6 hours  PRN (16 Sep 2020 01:38)    MEDICATIONS  (STANDING):  heparin   Injectable 5000 Unit(s) SubCutaneous every 12 hours  hydroxychloroquine 200 milliGRAM(s) Oral two times a day  lactated ringers. 1000 milliLiter(s) (100 mL/Hr) IV Continuous <Continuous>  levothyroxine 100 MICROGram(s) Oral daily  pantoprazole    Tablet 40 milliGRAM(s) Oral every 12 hours  predniSONE   Tablet 20 milliGRAM(s) Oral daily    MEDICATIONS  (PRN):      Allergies  Coconut       Review of Systems:   Constitutional:  No Fever, No Chills  ENT/Mouth:  No Hearing Changes,  No Difficulty Swallowing  Eyes:  No Eye Pain, No Vision Changes  Cardiovascular:  No Chest Pain, No Palpitations  Respiratory:  No Cough, No Dyspnea  Gastrointestinal:  As described in HPI  Musculoskeletal:  No Joint Swelling, No Back Pain  Skin:  No Skin Lesions, No Jaundice  Neuro:  No Syncope, No Dizziness  Heme/Lymph:  No Bruising, No Bleeding.          Physical Examination:  T(C): 37.1 (09-16-20 @ 07:32), Max: 37.1 (09-16-20 @ 07:32)  HR: 58 (09-16-20 @ 07:32) (58 - 67)  BP: 96/53 (09-16-20 @ 07:32) (93/51 - 98/56)  RR: 16 (09-16-20 @ 07:32) (16 - 18)  SpO2: 98% (09-16-20 @ 07:32) (97% - 98%)        Constitutional: No acute distress.  Eyes:. Conjunctivae are clear, Sclera is non-icteric.  Ears Nose and Throat: The external ears are normal appearing,  Oral mucosa is pink and moist.  Respiratory:  No signs of respiratory distress. Lung sounds are clear bilaterally.  Cardiovascular:  S1 S2, Regular rate and rhythm.  GI: Abdomen is soft, symmetric, and +tenderness to palpation the epigastrium without distention. There are no visible lesions. Bowel sounds are present and normoactive in all four quadrants. No masses, hepatomegaly, or splenomegaly are noted.   Neuro: No Tremor, No involuntary movements  Skin: No rashes, No Jaundice.          Data: (reviewed by attending)                        12.0   6.10  )-----------( 211      ( 16 Sep 2020 05:40 )             36.5     Hgb Trend:  12.0  09-16-20 @ 05:40  13.0  09-15-20 @ 12:15      09-16    137  |  104  |  9<L>  ----------------------------<  75  3.6   |  26  |  0.7    Ca    8.0<L>      16 Sep 2020 05:40  Mg     2.0     09-16    TPro  5.6<L>  /  Alb  3.5  /  TBili  0.3  /  DBili  x   /  AST  16  /  ALT  12  /  AlkPhos  43  09-16    Liver panel trend:  TBili 0.3   /   AST 16   /   ALT 12   /   AlkP 43   /   Tptn 5.6   /   Alb 3.5    /   DBili --      09-16  TBili 0.3   /   AST 13   /   ALT 10   /   AlkP 47   /   Tptn 6.6   /   Alb 4.4    /   DBili --      09-15      PT/INR - ( 15 Sep 2020 12:15 )   PT: 12.00 sec;   INR: 1.04 ratio         PTT - ( 15 Sep 2020 12:15 )  PTT:28.4 sec        Radiology:(reviewed by attending)  CT Abdomen and Pelvis w/ IV Cont:   EXAM:  CT ABDOMEN AND PELVIS IC            PROCEDURE DATE:  09/15/2020            INTERPRETATION:  CLINICAL STATEMENT: Abdominal pain, history of pancreatitis.    TECHNIQUE: Contiguous axial CT images were obtained from the lower chest to the pubicsymphysis following administration of 100cc Omnipaque 350 intravenous contrast.  Oral contrast was not administered.  Reformatted images in the coronal and sagittal planes were acquired.    COMPARISON: Same day right upper quadrant abdominal ultrasound.        FINDINGS:    LOWER CHEST: Dependent atelectasis.    HEPATOBILIARY: Hepatic steatosis. No biliary ductal dilatation. Unremarkable CT appearance of the gallbladder.    SPLEEN: Unremarkable.    PANCREAS: Unremarkable.    ADRENAL GLANDS: Unremarkable.    KIDNEYS: Unremarkable.    ABDOMINOPELVIC NODES: Unremarkable.    PELVIC ORGANS: 3.7 cm unilocular left adnexal cyst. IUD noted in the uterus. Collapsed bladder, limiting evaluation.    PERITONEUM/MESENTERY/BOWEL: No evidence of bowel obstruction, free air, or ascites. Appendix not visualized. No focal inflammatory changes in the right lower quadrant.    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 Mild degenerative changes of the spine.      IMPRESSION:    1.  No CT evidence of acute abdominopelvic pathology.  2.  3.7 cm unilocular left adnexal cyst.  3.  Other findings in the body of the report.            MACHELLE HOLT M.D., RESIDENT RADIOLOGIST  This document has been electronically signed.  ROSA HENSLEY M.D., ATTENDING RADIOLOGIST  This document has been electronically signed. Sep 15 2020  4:29PM (09-15-20 @ 15:25)    US Abdomen Limited:   EXAM:  US ABDOMEN LIMITED            PROCEDURE DATE:  09/15/2020            INTERPRETATION:  CLINICAL INFORMATION: Right upper quadrant pain.    COMPARISON: None available.    TECHNIQUE: Sonography of the right upper quadrant.    FINDINGS:    Liver: Within normal limits.  Bile ducts: Normal caliber. Common bile duct measures 3 mm.  Gallbladder: No cholelithiasis. No pericholecystic fluid or gallbladder wall thickening. Negative sonographic Aly sign.  Pancreas: Visualized portions are within normal limits.  Right kidney: 10.1 cm. Vascular flow demonstrated. No hydronephrosis or calculi.  Ascites: None.  Aorta/IVC: Visualized portions are within normal limits.    IMPRESSION:    Unremarkable right upper quadrant abdominal ultrasound.              DANI MCINTOSH M.D., RESIDENT RADIOLOGIST  This document has been electronically signed.  JIMMIE ANTOINE M.D., ATTENDING RADIOLOGIST  This document has been electronically signed. Sep 15 2020 12:32PM (09-15-20 @ 12:23)

## 2020-09-16 NOTE — H&P ADULT - NSHPLABSRESULTS_GEN_ALL_CORE
CBC Full  -  ( 15 Sep 2020 12:15 )  WBC Count : 6.16 K/uL  RBC Count : 4.42 M/uL  Hemoglobin : 13.0 g/dL  Hematocrit : 39.4 %  Platelet Count - Automated : 253 K/uL  Mean Cell Volume : 89.1 fL  Mean Cell Hemoglobin : 29.4 pg  Mean Cell Hemoglobin Concentration : 33.0 g/dL  Auto Neutrophil # : 3.25 K/uL  Auto Lymphocyte # : 2.24 K/uL  Auto Monocyte # : 0.47 K/uL  Auto Eosinophil # : 0.07 K/uL  Auto Basophil # : 0.05 K/uL  Auto Neutrophil % : 52.8 %  Auto Lymphocyte % : 36.4 %  Auto Monocyte % : 7.6 %  Auto Eosinophil % : 1.1 %  Auto Basophil % : 0.8 %  09-15    142  |  106  |  13  ----------------------------<  81  3.8   |  26  |  0.7    Ca    9.0      15 Sep 2020 12:15    TPro  6.6  /  Alb  4.4  /  TBili  0.3  /  DBili  x   /  AST  13  /  ALT  10  /  AlkPhos  47  09-15  Lipase, Serum: 38 U/L (09.15.20 @ 12:15)  < from: CT Abdomen and Pelvis w/ IV Cont (09.15.20 @ 15:25) >        IMPRESSION:    1.  No CT evidence of acute abdominopelvic pathology.  2.  3.7 cm unilocular left adnexal cyst.  3.  Other findings in the body of the report.    < end of copied text >

## 2020-09-17 LAB
CULTURE RESULTS: SIGNIFICANT CHANGE UP
SPECIMEN SOURCE: SIGNIFICANT CHANGE UP
TROPONIN T SERPL-MCNC: <0.01 NG/ML — SIGNIFICANT CHANGE UP

## 2020-09-17 PROCEDURE — 88305 TISSUE EXAM BY PATHOLOGIST: CPT | Mod: 26

## 2020-09-17 PROCEDURE — 99233 SBSQ HOSP IP/OBS HIGH 50: CPT

## 2020-09-17 PROCEDURE — 43239 EGD BIOPSY SINGLE/MULTIPLE: CPT

## 2020-09-17 PROCEDURE — 74181 MRI ABDOMEN W/O CONTRAST: CPT | Mod: 26

## 2020-09-17 PROCEDURE — 88312 SPECIAL STAINS GROUP 1: CPT | Mod: 26

## 2020-09-17 RX ORDER — ACETAMINOPHEN 500 MG
650 TABLET ORAL EVERY 6 HOURS
Refills: 0 | Status: DISCONTINUED | OUTPATIENT
Start: 2020-09-17 | End: 2020-09-18

## 2020-09-17 RX ORDER — FAMOTIDINE 10 MG/ML
20 INJECTION INTRAVENOUS ONCE
Refills: 0 | Status: DISCONTINUED | OUTPATIENT
Start: 2020-09-17 | End: 2020-09-18

## 2020-09-17 RX ORDER — TRAMADOL HYDROCHLORIDE 50 MG/1
50 TABLET ORAL EVERY 6 HOURS
Refills: 0 | Status: DISCONTINUED | OUTPATIENT
Start: 2020-09-17 | End: 2020-09-18

## 2020-09-17 RX ORDER — TRAMADOL HYDROCHLORIDE 50 MG/1
50 TABLET ORAL ONCE
Refills: 0 | Status: DISCONTINUED | OUTPATIENT
Start: 2020-09-17 | End: 2020-09-17

## 2020-09-17 RX ORDER — METOCLOPRAMIDE HCL 10 MG
10 TABLET ORAL ONCE
Refills: 0 | Status: COMPLETED | OUTPATIENT
Start: 2020-09-17 | End: 2020-09-17

## 2020-09-17 RX ORDER — ONDANSETRON 8 MG/1
4 TABLET, FILM COATED ORAL ONCE
Refills: 0 | Status: COMPLETED | OUTPATIENT
Start: 2020-09-17 | End: 2020-09-17

## 2020-09-17 RX ADMIN — TRAMADOL HYDROCHLORIDE 50 MILLIGRAM(S): 50 TABLET ORAL at 21:57

## 2020-09-17 RX ADMIN — PANTOPRAZOLE SODIUM 40 MILLIGRAM(S): 20 TABLET, DELAYED RELEASE ORAL at 05:59

## 2020-09-17 RX ADMIN — PANTOPRAZOLE SODIUM 40 MILLIGRAM(S): 20 TABLET, DELAYED RELEASE ORAL at 17:38

## 2020-09-17 RX ADMIN — Medication 200 MILLIGRAM(S): at 05:59

## 2020-09-17 RX ADMIN — TRAMADOL HYDROCHLORIDE 50 MILLIGRAM(S): 50 TABLET ORAL at 21:56

## 2020-09-17 RX ADMIN — ONDANSETRON 4 MILLIGRAM(S): 8 TABLET, FILM COATED ORAL at 21:57

## 2020-09-17 RX ADMIN — MORPHINE SULFATE 2 MILLIGRAM(S): 50 CAPSULE, EXTENDED RELEASE ORAL at 00:13

## 2020-09-17 RX ADMIN — Medication 10 MILLIGRAM(S): at 00:18

## 2020-09-17 RX ADMIN — Medication 200 MILLIGRAM(S): at 17:38

## 2020-09-17 RX ADMIN — MORPHINE SULFATE 2 MILLIGRAM(S): 50 CAPSULE, EXTENDED RELEASE ORAL at 00:44

## 2020-09-17 NOTE — PROGRESS NOTE ADULT - SUBJECTIVE AND OBJECTIVE BOX
The patient is a 44 year old female with a history of SLE and hypothyroidism who presented for a 5 day history of worsening abdominal pain. She is currently admitted to medicine for management of suspected GERD vs. PUD secondary to glucocorticoid usage.    Today is hospital day 2 for the patient. She continues to endorse right sided and epigastric abdominal pain that radiates to the back and some mild burning with urination. She denies nausea, vomiting, current diarrhea, constipation, or bloody bowel movements or urine. She has been NPO pending endoscopy today. She is urinating and is able to ambulate.             Klein Catheter (x)No/ ()Yes? Indication:          Central Line (x)No/ ()Yes? Indication:          IV Fluids ()No/ (x)Yes? Type:LR  Rate: 100  Indication: no oral intake           Supplemental O2 (x) No/ () Yes? Type: Indication:     MEDICAL HISTORY   Systemic Lupus Erythematosus   Hypothyroidism     SURGICAL HISTORY   s/p appendectomy     MEDICATIONS     MEDICATIONS  (STANDING):  cefTRIAXone   IVPB      cefTRIAXone   IVPB 1000 milliGRAM(s) IV Intermittent once  heparin   Injectable 5000 Unit(s) SubCutaneous every 12 hours  hydroxychloroquine 200 milliGRAM(s) Oral two times a day  lactated ringers. 1000 milliLiter(s) (100 mL/Hr) IV Continuous <Continuous>  levothyroxine 100 MICROGram(s) Oral daily  pantoprazole    Tablet 40 milliGRAM(s) Oral every 12 hours  predniSONE   Tablet 20 milliGRAM(s) Oral daily    MEDICATIONS  (PRN):  morphine  - Injectable 2 milliGRAM(s) IV Push every 6 hours PRN Moderate Pain (4 - 6)    ALLERGIES   No known drug allergies     SOCIAL HISTORY   Never smoker, never drinker     VITALS   T(C): 35.8 (17 Sep 2020 05:47), Max: 37.3 (16 Sep 2020 16:01)  T(F): 96.5 (17 Sep 2020 05:47), Max: 99.2 (16 Sep 2020 16:01)  HR: 59 (17 Sep 2020 05:47) (59 - 65)  BP: 92/53 (17 Sep 2020 05:47) (92/53 - 108/69)  RR: 18 (17 Sep 2020 05:47) (18 - 18)  SpO2: 99% (16 Sep 2020 16:01) (99% - 99%)    PHYSICAL EXAM   GENERAL: no apparent distress, lying in bed comfortably  HEAD:  Atraumatic, Normocephalic, PERRL, EOMI, no nasal flaring, oral mucosa moist, no erythema   RESPIRATORY: Clear to auscultation bilaterally, good air entry   CARDIAC: regular rate and rhythm, no murmurs, rubs, or gallops. Normal S1/S2 , no additional heart sounds   ABDOMEN: Bowel sounds present; Soft, Nondistended. epigastric and left upper quadrant tenderness   EXTREMITIES:  2+ Peripheral Pulses, brisk capillary refill. No clubbing, cyanosis, or edema  NERVOUS SYSTEM:  Alert & Oriented X3, speech clear. No deficits   MSK: FROM all 4 extremities, full and equal strength  SKIN: No rashes or lesions    LABS               12.0   6.10  )-----------( 211      ( 16 Sep 2020 05:40 )             36.5         137  |  104  |  9<L>  ----------------------------<  75  3.6   |  26  |  0.7    Ca    8.0<L>      16 Sep 2020 05:40  Mg     2.0         TPro  5.6<L>  /  Alb  3.5  /  TBili  0.3  /  DBili  x   /  AST  16  /  ALT  12  /  AlkPhos  43      PT/INR - ( 15 Sep 2020 12:15 )   PT: 12.00 sec;   INR: 1.04 ratio       PTT - ( 15 Sep 2020 12:15 )  PTT:28.4 sec    Urinalysis Basic - ( 15 Sep 2020 14:13 )    Color: Yellow / Appearance: Slightly Turbid / S.027 / pH: x  Gluc: x / Ketone: Negative  / Bili: Negative / Urobili: <2 mg/dL   Blood: x / Protein: 30 mg/dL / Nitrite: Negative   Leuk Esterase: Large / RBC: 13 /HPF / WBC 57 /HPF   Sq Epi: x / Non Sq Epi: 10 /HPF / Bacteria: FewCulture - Urine (09.15.20 @ 14:13)   Specimen Source: .Urine Clean Catch (Midstream)   Culture Results:   <10,000 CFU/mL Normal Urogenital Jackie    The patient is a 44 year old female with a history of SLE and hypothyroidism who presented for a 5 day history of worsening abdominal pain. She is currently admitted to medicine for management of suspected GERD vs. PUD secondary to glucocorticoid usage.    Today is hospital day 2 for the patient. She continues to endorse right sided and epigastric abdominal pain that radiates to the back and some mild burning with urination. She denies nausea, vomiting, current diarrhea, constipation, or bloody bowel movements or urine. She has been NPO pending endoscopy today. She is urinating and is able to ambulate.             Klein Catheter (x)No/ ()Yes? Indication:          Central Line (x)No/ ()Yes? Indication:          IV Fluids ()No/ (x)Yes? Type:LR  Rate: 100  Indication: no oral intake           Supplemental O2 (x) No/ () Yes? Type: Indication:     MEDICAL HISTORY   Systemic Lupus Erythematosus   Hypothyroidism     SURGICAL HISTORY   s/p appendectomy     MEDICATIONS     MEDICATIONS  (STANDING):  cefTRIAXone   IVPB      cefTRIAXone   IVPB 1000 milliGRAM(s) IV Intermittent once  heparin   Injectable 5000 Unit(s) SubCutaneous every 12 hours  hydroxychloroquine 200 milliGRAM(s) Oral two times a day  lactated ringers. 1000 milliLiter(s) (100 mL/Hr) IV Continuous <Continuous>  levothyroxine 100 MICROGram(s) Oral daily  pantoprazole    Tablet 40 milliGRAM(s) Oral every 12 hours  predniSONE   Tablet 20 milliGRAM(s) Oral daily    MEDICATIONS  (PRN):  morphine  - Injectable 2 milliGRAM(s) IV Push every 6 hours PRN Moderate Pain (4 - 6)    ALLERGIES   No known drug allergies     SOCIAL HISTORY   Never smoker, never drinker     VITALS   T(C): 35.8 (17 Sep 2020 05:47), Max: 37.3 (16 Sep 2020 16:01)  T(F): 96.5 (17 Sep 2020 05:47), Max: 99.2 (16 Sep 2020 16:01)  HR: 59 (17 Sep 2020 05:47) (59 - 65)  BP: 92/53 (17 Sep 2020 05:47) (92/53 - 108/69)  RR: 18 (17 Sep 2020 05:47) (18 - 18)  SpO2: 99% (16 Sep 2020 16:01) (99% - 99%)    PHYSICAL EXAM   GENERAL: no apparent distress, lying in bed comfortably  HEAD:  Atraumatic, Normocephalic, PERRL, EOMI, no nasal flaring, oral mucosa moist, no erythema   RESPIRATORY: Clear to auscultation bilaterally, good air entry   CARDIAC: regular rate and rhythm, no murmurs, rubs, or gallops. Normal S1/S2 , no additional heart sounds   ABDOMEN: Bowel sounds present; Soft, Nondistended. epigastric and left upper quadrant tenderness   EXTREMITIES:  2+ Peripheral Pulses, brisk capillary refill. No clubbing, cyanosis, or edema  NERVOUS SYSTEM:  Alert & Oriented X3, speech clear. No deficits   MSK: FROM all 4 extremities, full and equal strength  SKIN: No rashes or lesions    LABS               12.0   6.10  )-----------( 211      ( 16 Sep 2020 05:40 )             36.5         137  |  104  |  9<L>  ----------------------------<  75  3.6   |  26  |  0.7    Ca    8.0<L>      16 Sep 2020 05:40  Mg     2.0         TPro  5.6<L>  /  Alb  3.5  /  TBili  0.3  /  DBili  x   /  AST  16  /  ALT  12  /  AlkPhos  43      PT/INR - ( 15 Sep 2020 12:15 )   PT: 12.00 sec;   INR: 1.04 ratio       PTT - ( 15 Sep 2020 12:15 )  PTT:28.4 sec    Urinalysis Basic - ( 15 Sep 2020 14:13 )    Color: Yellow / Appearance: Slightly Turbid / S.027 / pH: x  Gluc: x / Ketone: Negative  / Bili: Negative / Urobili: <2 mg/dL   Blood: x / Protein: 30 mg/dL / Nitrite: Negative   Leuk Esterase: Large / RBC: 13 /HPF / WBC 57 /HPF   Sq Epi: x / Non Sq Epi: 10 /HPF / Bacteria: FewCulture - Urine (09.15.20 @ 14:13)   Specimen Source: .Urine Clean Catch (Midstream)   Culture Results:   <10,000 CFU/mL Normal Urogenital Jackie     Imaging and Studies   < from: EGD (20 @ 12:30) >  Findings:   Esophagus Mucosa Normal mucosa was noted in the whole esophagus.   Stomach Mucosa Diffuse erythema of the mucosa was noted in the stomach. These  findings are compatible with non-erosive gastritis. Multiple cold forceps  biopsies were performed for histology.   Duodenum Mucosa Normal mucosa was noted in the whole examined duodenum. Random  mucosal biopsies were obtained to evaluate for histologic features of celiac  disease. Multiple cold forceps biopsies were performed for histology in the  second part of the duodenum.     Impressions:    Normal mucosa in the whole esophagus.    Erythema in the stomach compatible with non-erosive gastritis. (Biopsy).    Normal mucosa in the whole examined duodenum. (Biopsy).

## 2020-09-17 NOTE — PRE-OP CHECKLIST - SELECT TESTS ORDERED
CBC/CMP/PT/PTT/Hepatic Function/Type and Screen/Urinalysis UCG/CMP/Type and Screen/Urinalysis/PT/PTT/Hepatic Function/negative/CBC

## 2020-09-17 NOTE — PROGRESS NOTE ADULT - ASSESSMENT
Mrs. Morales is a 44 year old F with a PMHx significant for SLE and hypothyroidism presents for a 5 day history of worsening abdominal pain.  On Thursday, 9/10/2020, patient reported excruciating burning, sharp-like, pain localized to her epigastric area that radiated to her left flank pain.  She said that 30 min prior to the pain onset, she had [employer].     # Abdominal pain may be GERD/PUD  - CT Abdomen and Pelvis w/ IV Cont (09.15.20 @ 15:25) >No CT evidence of acute abdominopelvic pathology.  3.7 cm unilocular left adnexal cyst.  - US Abdomen Limited (09.15.20 @ 12:23) >Unremarkable right upper quadrant abdominal ultrasound.  -Lipase, Serum: 38 U/L (09.15.20 @ 12:15)  - c/w PPI  - NPO , IV fluids  -  Endoscopy  today on 09/17/2020    # Cystitis  - start ceftriaxone    # H/o lupus  - c/w prednisone, hydroxychloroquin    # Hypothyroidism  - c/w synthroid    # DVT prophylaxis    # Full code    #Pending: Endoscopy today  # Discussed plan of care with patient  # Disposition: Home

## 2020-09-17 NOTE — PROGRESS NOTE ADULT - ASSESSMENT
The patient is a 44 year old female with a history of SLE and hypothyroidism who presented for a 5 day history of worsening abdominal pain. She is currently admitted to medicine for management of suspected GERD vs. PUD secondary to glucocorticoid usage.    Possible GERD vs. PUD 2/2 Glucocorticoids   -Patient vitally stable on admission   -CBC and CMP unremarkable   -Lipase 38   -CTAP negative for acute intraabdominal process, no radiographic evidence of acute pancreatitis   -AZT discontinued as it can cause pancreatitis and patient was placed on prednisone at end of August   -H pylori stool Ag ordered --> f/u results   -Protonix 40mg PO BID  -Will start LR 100cc/hour   -CLD for now   -F/u results of endoscopy     Acute Uncomplicated Cystitis   -UA showed positive leukocyte esterase and WBCs  -start ceftriaxone 1g qd     History of Acute Pancreatitis  -Previous Lipase>2000 at [facility] and 127 on outpatient labs on 8/14/2020   -Lipase 38 on this admission  -CTAP negative for acute pancreatitis   -Low suspicion  for acute pancreatitis on this admission     SLE  c/w prednisone 40mg PO daily  c/w HCQ 200mg PO BID     Hypothyroidism   c/w synthroid 100mcg PO daily    DVT ppx: Heparin q12   Activity: AAT   Diet: CLD  Dispo: Acute  Full Code The patient is a 44 year old female with a history of SLE and hypothyroidism who presented for a 5 day history of worsening abdominal pain. She is currently admitted to medicine for management of suspected GERD vs. PUD secondary to glucocorticoid usage.    Possible GERD vs. PUD 2/2 Glucocorticoids   -Patient vitally stable on admission   -CBC and CMP unremarkable   -Lipase 38   -CTAP negative for acute intraabdominal process, no radiographic evidence of acute pancreatitis   -AZT discontinued as it can cause pancreatitis and patient was placed on prednisone at end of August   -H pylori stool Ag ordered --> f/u results   -Protonix 40mg PO BID  -Will start LR 100cc/hour   -CLD for now   -F/u results of endoscopy     Acute Uncomplicated Cystitis   -UA showed positive leukocyte esterase and WBCs  -start ceftriaxone 1g qd     History of Acute Pancreatitis  -Previous Lipase>2000 at [facility] and 127 on outpatient labs on 8/14/2020   -Lipase 38 on this admission  -CTAP negative for acute pancreatitis   -Low suspicion  for acute pancreatitis on this admission     SLE  -c/w prednisone 40mg PO daily  -c/w HCQ 200mg PO BID     Hypothyroidism   -c/w synthroid 100mcg PO daily    DVT ppx: Heparin q12   Activity: AAT   Diet: CLD  Dispo: Acute  Full Code The patient is a 44 year old female with a history of SLE and hypothyroidism who presented for a 5 day history of worsening abdominal pain. She is currently admitted to medicine for management of suspected GERD vs. PUD secondary to glucocorticoid usage.    Possible GERD vs. PUD 2/2 Glucocorticoids   -Patient vitally stable on admission   -CBC and CMP unremarkable   -Lipase 38   -CTAP negative for acute intraabdominal process, no radiographic evidence of acute pancreatitis   -AZT discontinued as it can cause pancreatitis and patient was placed on prednisone at end of August   -H pylori stool Ag ordered --> f/u results   -Protonix 40mg PO BID  -Will start LR 100cc/hour   -CLD for now   -EGD showed erythema in the stomach compatible with non-erosive gastritis.  - GI recommendations following EGD:  -Advance diet as tolerated  -Protonix 40 mg po daily  -Follow-up visit in 2-4 weeks in the [facility]      Acute Uncomplicated Cystitis   -UA showed positive leukocyte esterase and WBCs  -start ceftriaxone 1g qd     History of Acute Pancreatitis  -Previous Lipase>2000 at [facility] and 127 on outpatient labs on 8/14/2020   -Lipase 38 on this admission  -CTAP negative for acute pancreatitis   -Low suspicion  for acute pancreatitis on this admission     SLE  -c/w prednisone 40mg PO daily  -c/w HCQ 200mg PO BID     Hypothyroidism   -c/w synthroid 100mcg PO daily    DVT ppx: Heparin q12   Activity: AAT   Diet: CLD  Dispo: Acute  Full Code The patient is a 44 year old female with a history of SLE and hypothyroidism who presented for a 5 day history of worsening abdominal pain. She is currently admitted to medicine for management of suspected GERD vs. PUD secondary to glucocorticoid usage.    Possible GERD vs. PUD 2/2 Glucocorticoids   -Patient vitally stable on admission   -CBC and CMP unremarkable   -Lipase 38   -CTAP negative for acute intraabdominal process, no radiographic evidence of acute pancreatitis   -AZT discontinued as it can cause pancreatitis and patient was placed on prednisone at end of August   -H pylori stool Ag ordered --> f/u results   -Protonix 40mg PO BID  -Will start LR 100cc/hour   -CLD for now   -EGD showed erythema in the stomach compatible with non-erosive gastritis.  - GI recommendations following EGD:  - MRI Abd +/- contrast  - MRCP      Acute Uncomplicated Cystitis   -UA showed positive leukocyte esterase and WBCs  -start ceftriaxone 1g qd     History of Acute Pancreatitis  -Previous Lipase>2000 at [facility] and 127 on outpatient labs on 8/14/2020   -Lipase 38 on this admission  -CTAP negative for acute pancreatitis   -Low suspicion  for acute pancreatitis on this admission     SLE  -c/w prednisone 40mg PO daily  -c/w HCQ 200mg PO BID     Hypothyroidism   -c/w synthroid 100mcg PO daily    DVT ppx: Heparin q12   Activity: AAT   Diet: CLD  Dispo: Acute  Full Code     Edited and signed by Chidi Du MD, PGY-1

## 2020-09-17 NOTE — PROGRESS NOTE ADULT - SUBJECTIVE AND OBJECTIVE BOX
Patient is a 44y old  Female who presents with a chief complaint of Abdominal Pain (17 Sep 2020 10:26)    Patient was seen and examined.  C/o epigastic pain  Denies nausea, Vomiting, Diarrhea  Denies any other complaints.  All systems reviewed positive history as mentioned above.    PAST MEDICAL & SURGICAL HISTORY:  Hypothyroidism  SLE (systemic lupus erythematosus)  S/P appendectomy    Allergies  No Known Allergies  Intolerances    MEDICATIONS  (STANDING):  cefTRIAXone   IVPB      cefTRIAXone   IVPB 1000 milliGRAM(s) IV Intermittent once  heparin   Injectable 5000 Unit(s) SubCutaneous every 12 hours  hydroxychloroquine 200 milliGRAM(s) Oral two times a day  lactated ringers. 1000 milliLiter(s) (100 mL/Hr) IV Continuous <Continuous>  levothyroxine 100 MICROGram(s) Oral daily  pantoprazole    Tablet 40 milliGRAM(s) Oral every 12 hours  predniSONE   Tablet 20 milliGRAM(s) Oral daily    MEDICATIONS  (PRN):  morphine  - Injectable 2 milliGRAM(s) IV Push every 6 hours PRN Moderate Pain (4 - 6)    Vital Signs Last 24 Hrs  T(C): 36.7  T(F): 98  HR: 70  BP: 116/76  BP(mean): --  RR: 18  SpO2: 100%    O/E:  Awake, alert, not in distress.  HEENT: atraumatic, EOMI.  Chest: clear.  CVS: SIS2 +, no murmur.  P/A: Soft, BS+, epigastric tenderness  CNS: non focal.  Ext: no edema feet.  Skin: no rash, no ulcers.  All systems reviewed positive findings as above.                          12.0   6.10  )-----------( 211      ( 16 Sep 2020 05:40 )             36.5<L>        137  |  104  |  9<L>  ----------------------------<  75  3.6   |  26  |  0.7    Ca    8.0<L>      16 Sep 2020 05:40  Mg     2.0         TPro  5.6<L>  /  Alb  3.5  /  TBili  0.3  /  DBili  x   /  AST  16  /  ALT  12  /  AlkPhos  43      Urinalysis Basic - ( 15 Sep 2020 14:13 )    Color: Yellow / Appearance: Slightly Turbid / S.027 / pH: x  Gluc: x / Ketone: Negative  / Bili: Negative / Urobili: <2 mg/dL   Blood: x / Protein: 30 mg/dL / Nitrite: Negative   Leuk Esterase: Large / RBC: 13 /HPF / WBC 57 /HPF   Sq Epi: x / Non Sq Epi: 10 /HPF / Bacteria: Few        Culture - Urine (collected 15 Sep 2020 14:13)  Source: .Urine Clean Catch (Midstream)  Final Report (17 Sep 2020 00:22):    <10,000 CFU/mL Normal Urogenital Jackie

## 2020-09-18 VITALS
HEART RATE: 70 BPM | DIASTOLIC BLOOD PRESSURE: 54 MMHG | TEMPERATURE: 98 F | RESPIRATION RATE: 19 BRPM | SYSTOLIC BLOOD PRESSURE: 106 MMHG

## 2020-09-18 LAB
ANION GAP SERPL CALC-SCNC: 12 MMOL/L — SIGNIFICANT CHANGE UP (ref 7–14)
BUN SERPL-MCNC: 9 MG/DL — LOW (ref 10–20)
CALCIUM SERPL-MCNC: 8.3 MG/DL — LOW (ref 8.5–10.1)
CHLORIDE SERPL-SCNC: 103 MMOL/L — SIGNIFICANT CHANGE UP (ref 98–110)
CO2 SERPL-SCNC: 21 MMOL/L — SIGNIFICANT CHANGE UP (ref 17–32)
CREAT SERPL-MCNC: 0.6 MG/DL — LOW (ref 0.7–1.5)
GLUCOSE SERPL-MCNC: 64 MG/DL — LOW (ref 70–99)
HCT VFR BLD CALC: 35.2 % — LOW (ref 37–47)
HGB BLD-MCNC: 12 G/DL — SIGNIFICANT CHANGE UP (ref 12–16)
MCHC RBC-ENTMCNC: 29.7 PG — SIGNIFICANT CHANGE UP (ref 27–31)
MCHC RBC-ENTMCNC: 34.1 G/DL — SIGNIFICANT CHANGE UP (ref 32–37)
MCV RBC AUTO: 87.1 FL — SIGNIFICANT CHANGE UP (ref 81–99)
NRBC # BLD: 0 /100 WBCS — SIGNIFICANT CHANGE UP (ref 0–0)
PLATELET # BLD AUTO: 219 K/UL — SIGNIFICANT CHANGE UP (ref 130–400)
POTASSIUM SERPL-MCNC: 4.1 MMOL/L — SIGNIFICANT CHANGE UP (ref 3.5–5)
POTASSIUM SERPL-SCNC: 4.1 MMOL/L — SIGNIFICANT CHANGE UP (ref 3.5–5)
RBC # BLD: 4.04 M/UL — LOW (ref 4.2–5.4)
RBC # FLD: 11.8 % — SIGNIFICANT CHANGE UP (ref 11.5–14.5)
SODIUM SERPL-SCNC: 136 MMOL/L — SIGNIFICANT CHANGE UP (ref 135–146)
WBC # BLD: 6.13 K/UL — SIGNIFICANT CHANGE UP (ref 4.8–10.8)
WBC # FLD AUTO: 6.13 K/UL — SIGNIFICANT CHANGE UP (ref 4.8–10.8)

## 2020-09-18 PROCEDURE — 93010 ELECTROCARDIOGRAM REPORT: CPT

## 2020-09-18 PROCEDURE — 99232 SBSQ HOSP IP/OBS MODERATE 35: CPT

## 2020-09-18 PROCEDURE — 99239 HOSP IP/OBS DSCHRG MGMT >30: CPT

## 2020-09-18 RX ORDER — PANTOPRAZOLE SODIUM 20 MG/1
1 TABLET, DELAYED RELEASE ORAL
Qty: 60 | Refills: 0
Start: 2020-09-18 | End: 2020-10-17

## 2020-09-18 RX ORDER — CEFPODOXIME PROXETIL 100 MG
100 TABLET ORAL EVERY 12 HOURS
Refills: 0 | Status: DISCONTINUED | OUTPATIENT
Start: 2020-09-18 | End: 2020-09-18

## 2020-09-18 RX ORDER — CEFPODOXIME PROXETIL 100 MG
1 TABLET ORAL
Qty: 12 | Refills: 0
Start: 2020-09-18 | End: 2020-09-23

## 2020-09-18 RX ORDER — CEFDINIR 250 MG/5ML
1 POWDER, FOR SUSPENSION ORAL
Qty: 0 | Refills: 0 | DISCHARGE

## 2020-09-18 RX ORDER — PANTOPRAZOLE SODIUM 20 MG/1
1 TABLET, DELAYED RELEASE ORAL
Qty: 30 | Refills: 0
Start: 2020-09-18 | End: 2020-10-17

## 2020-09-18 RX ADMIN — Medication 20 MILLIGRAM(S): at 05:34

## 2020-09-18 RX ADMIN — HEPARIN SODIUM 5000 UNIT(S): 5000 INJECTION INTRAVENOUS; SUBCUTANEOUS at 05:34

## 2020-09-18 RX ADMIN — Medication 100 MICROGRAM(S): at 05:34

## 2020-09-18 RX ADMIN — Medication 200 MILLIGRAM(S): at 05:34

## 2020-09-18 RX ADMIN — PANTOPRAZOLE SODIUM 40 MILLIGRAM(S): 20 TABLET, DELAYED RELEASE ORAL at 06:02

## 2020-09-18 NOTE — DISCHARGE NOTE PROVIDER - NSDCCPCAREPLAN_GEN_ALL_CORE_FT
PRINCIPAL DISCHARGE DIAGNOSIS  Diagnosis: Intractable abdominal pain  Assessment and Plan of Treatment: Abdominal Pain  Many things can cause abdominal pain. Many times, abdominal pain is not caused by a disease and will improve without treatment. Your health care provider will do a physical exam to determine if there is a dangerous cause of your pain; blood tests and imaging may help determine the cause of your pain. However, in many cases, no cause may be found and you may need further testing as an outpatient. Monitor your abdominal pain for any changes.   SEEK IMMEDIATE MEDICAL CARE IF YOU HAVE ANY OF THE FOLLOWING SYMPTOMS: worsening abdominal pain, uncontrollable vomiting, profuse diarrhea, inability to have bowel movements or pass gas, black or bloody stools, fever accompanying chest pain or back pain, or fainting. These symptoms may represent a serious problem that is an emergency. Do not wait to see if the symptoms will go away. Get medical help right away. Call 911 and do not drive yourself to the hospital.        SECONDARY DISCHARGE DIAGNOSES  Diagnosis: Urinary tract infection  Assessment and Plan of Treatment:   You were noted either on arrival or during this hospitalization to have a Urinary Tract Infection. You may have already been treated and completed the antibiotics, please refer to the list of medications present on your discharge paperwork. If you notice that there are antibiotics listed, these may be to treat your infection, be sure to complete taking the full course, whether you have symptoms or not, as prescribed.  While taking antibiotics, you may benefit from taking a probiotic such as florastore to help to try and prevent an infectious type of diarrhea known as C Diff. If you notice that you begin having severe watery diarrhea, more than 4-5 episodes a day, please see your Primary Care Doctor or come to the ER to have your stool tested for this infection.   It is not necessary to repeat a urine test to see if the infection is gone, it is assumed that after treatment it should have resolved. However, if you continue to have symptoms, please see your Primary Care doctor or return to the ER.

## 2020-09-18 NOTE — DISCHARGE NOTE PROVIDER - CARE PROVIDER_API CALL
Jeremias Sparks  Infectious Disease  [address] [place] [address]  Phone: [phone]  Fax: [phone]  Follow Up Time: 1 week   Jeremias Sparks  Infectious Disease  [address] [place] [address]  Phone: [phone]  Fax: [phone]  Follow Up Time: 1 week    Gopal Torres  GASTROENTEROLOGY  [address] [place] [id number]  Phone: [phone]  Fax: [phone]  Follow Up Time: 2 weeks

## 2020-09-18 NOTE — PROGRESS NOTE ADULT - SUBJECTIVE AND OBJECTIVE BOX
Gastroenterology progress note:     Patient is a 44y old  Female who presents with a chief complaint of Abdominal Pain (18 Sep 2020 10:24)       Admitted on: 09-15-20    We are following the patient for: Epigastric pain      Interval History: Today is hospital day 3 for the patient. She continues to endorse epigastric pain 5/10 on pain scale radiating to the left flank and black. Pt denies nausea, or vomiting. Pt's last BM was on Monday.       Prior records reviewed (Y/N): Y  History obtained from someone other than patient (Y/N): N      PAST MEDICAL & SURGICAL HISTORY:  Hypothyroidism    SLE (systemic lupus erythematosus)    S/P appendectomy        MEDICATIONS  (STANDING):  cefpodoxime 100 milliGRAM(s) Oral every 12 hours  heparin   Injectable 5000 Unit(s) SubCutaneous every 12 hours  hydroxychloroquine 200 milliGRAM(s) Oral two times a day  levothyroxine 100 MICROGram(s) Oral daily  pantoprazole    Tablet 40 milliGRAM(s) Oral every 12 hours  predniSONE   Tablet 20 milliGRAM(s) Oral daily    MEDICATIONS  (PRN):  acetaminophen   Tablet .. 650 milliGRAM(s) Oral every 6 hours PRN Mild Pain (1 - 3), Moderate Pain (4 - 6)  traMADol 50 milliGRAM(s) Oral every 6 hours PRN Severe Pain (7 - 10)      Allergies  No Known Allergies      Review of Systems:   Cardiovascular:  No Chest Pain, No Palpitations  Respiratory:  No Cough, No Dyspnea  Gastrointestinal:  As described in HPI    Physical Examination:  T(C): 35.8 (20 @ 05:10), Max: 36.7 (20 @ 12:40)  HR: 60 (20 @ 05:10) (60 - 71)  BP: 91/53 (20 @ 05:10) (86/55 - 116/76)  RR: 18 (20 @ 05:10) (18 - 19)  SpO2: 100% (20 @ 12:50) (99% - 100%)      Constitutional: No acute distress.  Respiratory:  No signs of respiratory distress. Lung sounds are clear bilaterally.  Cardiovascular:  S1 S2, Regular rate and rhythm.  Abdominal: Abdomen is soft, symmetric, and +tenderness to palpation in the epigastrium without distention. There are no visible lesions. Bowel sounds are present and normoactive in all four quadrants. No masses, hepatomegaly, or splenomegaly are noted.   Skin: No rashes, No Jaundice.        Data: (reviewed by attending)                        12.0   6.13  )-----------( 219      ( 18 Sep 2020 05:55 )             35.2     Hgb trend:  12.0  20 @ 05:55  12.0  20 @ 05:40        09-18    136  |  103  |  9<L>  ----------------------------<  64<L>  4.1   |  21  |  0.6<L>    Ca    8.3<L>      18 Sep 2020 05:55      Liver panel trend:  TBili 0.3   /   AST 16   /   ALT 12   /   AlkP 43   /   Tptn 5.6   /   Alb 3.5    /   DBili --        TBili 0.3   /   AST 13   /   ALT 10   /   AlkP 47   /   Tptn 6.6   /   Alb 4.4    /   DBili --      09-15          Culture - Urine (collected 15 Sep 2020 14:13)  Source: .Urine Clean Catch (Midstream)  Final Report (17 Sep 2020 00:22):    <10,000 CFU/mL Normal Urogenital Jackie         Radiology: (reviewed by attending)    < from: EGD (20 @ 12:30) >  EGD Report Date: 2020 12:30 PM   Patient Name: SAI ANN   MRN: [id number]   Account Number: [id number]  Gender: Female    (age): 1975 (44)   Instrument(s): GIF- (8775)([id number])    Attending:   Gopal Torres MD     Fellow:   Brennan Mcbride MD       Referring Physician:   MAURY THOMAS  [address] [place] [id number] [phone] (phone)  [phone] (fax)           History of Present Illness:   H&P was previously reviewed.       Administered Medications: As per Anesthesiology Record     Indications: Abdominal pain: 789.00 - R10.9  Procedure:   The procedure, indications, preparation and potential complications were  explained to the patient, who indicated understanding and signed the  corresponding consent forms. MAC was administered by anesthesiologist.  Continuous pulse oximetry and blood pressure monitoring were used throughout the  procedure. Supplemental oxygen was used. Patient was placed in the left lateral  decubitus position. The endoscope was introduced through the mouth and advanced  under direct visualization until the second part of the duodenum was reached.  Patient tolerance to the procedure was good. The procedure was not difficult.  Blood loss was minimal.      Limitations/Complications: There were no apparent limitations or complications  Findings:   Esophagus Mucosa Normal mucosa was noted in the whole esophagus.   Stomach Mucosa Diffuse erythema of the mucosa was noted in the stomach. These  findings are compatible with non-erosive gastritis. Multiple cold forceps  biopsies were performed for histology.   Duodenum Mucosa Normal mucosa was noted in the whole examined duodenum. Random  mucosal biopsies were obtained to evaluate for histologic features of celiac  disease. Multiple cold forceps biopsies were performed for histology in the  second part of the duodenum.                          Impressions:    Normal mucosa in the whole esophagus.    Erythema in the stomach compatible with non-erosive gastritis. (Biopsy).    Normal mucosa in the whole examined duodenum. (Biopsy).     Plan: Advance diet as tolerated  Protonix 40 mg po daily  Follow-up visit in 2-4 weeks in the GI [facility] clinic    Attending Participation: I was present and participated during the entire  procedure, including non-key portions.       Gopal Torres MD    Version 1, Electronically signed on 2020 12:45:34 PM by Gopal Torres MD    < end of copied text >         Gastroenterology progress note:     Patient is a 44y old  Female who presents with a chief complaint of Abdominal Pain (18 Sep 2020 10:24)       Admitted on: 09-15-20    We are following the patient for: Epigastric pain      Interval History: Today is hospital day 3 for the patient. She continues to endorse epigastric pain 5/10 on pain scale radiating to the left flank and black. Pt denies nausea, or vomiting. Pt's last BM was on Monday.    wants to try some food       Prior records reviewed (Y/N): Y  History obtained from someone other than patient (Y/N): N      PAST MEDICAL & SURGICAL HISTORY:  Hypothyroidism    SLE (systemic lupus erythematosus)    S/P appendectomy        MEDICATIONS  (STANDING):  cefpodoxime 100 milliGRAM(s) Oral every 12 hours  heparin   Injectable 5000 Unit(s) SubCutaneous every 12 hours  hydroxychloroquine 200 milliGRAM(s) Oral two times a day  levothyroxine 100 MICROGram(s) Oral daily  pantoprazole    Tablet 40 milliGRAM(s) Oral every 12 hours  predniSONE   Tablet 20 milliGRAM(s) Oral daily    MEDICATIONS  (PRN):  acetaminophen   Tablet .. 650 milliGRAM(s) Oral every 6 hours PRN Mild Pain (1 - 3), Moderate Pain (4 - 6)  traMADol 50 milliGRAM(s) Oral every 6 hours PRN Severe Pain (7 - 10)      Allergies  No Known Allergies      Review of Systems:   Cardiovascular:  No Chest Pain, No Palpitations  Respiratory:  No Cough, No Dyspnea  Gastrointestinal:  As described in HPI    Physical Examination:  T(C): 35.8 (20 @ 05:10), Max: 36.7 (20 @ 12:40)  HR: 60 (20 @ 05:10) (60 - 71)  BP: 91/53 (20 @ 05:10) (86/55 - 116/76)  RR: 18 (20 @ 05:10) (18 - 19)  SpO2: 100% (20 @ 12:50) (99% - 100%)      Constitutional: No acute distress.  Respiratory:  No signs of respiratory distress. Lung sounds are clear bilaterally.  Cardiovascular:  S1 S2, Regular rate and rhythm.  Abdominal: Abdomen is soft, symmetric, and +tenderness to palpation in the epigastrium without distention. There are no visible lesions. Bowel sounds are present and normoactive in all four quadrants. No masses, hepatomegaly, or splenomegaly are noted.   Skin: No rashes, No Jaundice.        Data: (reviewed by attending)                        12.0   6.13  )-----------( 219      ( 18 Sep 2020 05:55 )             35.2     Hgb trend:  12.0  20 @ 05:55  12.0  20 @ 05:40        09-18    136  |  103  |  9<L>  ----------------------------<  64<L>  4.1   |  21  |  0.6<L>    Ca    8.3<L>      18 Sep 2020 05:55      Liver panel trend:  TBili 0.3   /   AST 16   /   ALT 12   /   AlkP 43   /   Tptn 5.6   /   Alb 3.5    /   DBili --        TBili 0.3   /   AST 13   /   ALT 10   /   AlkP 47   /   Tptn 6.6   /   Alb 4.4    /   DBili --      09-15          Culture - Urine (collected 15 Sep 2020 14:13)  Source: .Urine Clean Catch (Midstream)  Final Report (17 Sep 2020 00:22):    <10,000 CFU/mL Normal Urogenital Jackie         Radiology: (reviewed by attending)    < from: EGD (20 @ 12:30) >  EGD Report Date: 2020 12:30 PM   Patient Name: SAI ANN   MRN: [id number]   Account Number: [id number]  Gender: Female    (age): 1975 (44)   Instrument(s): GIF- (8775)([id number])    Attending:   Gopal Torres MD     Fellow:   Brennan Mcbride MD       Referring Physician:   MAURY THOMAS  [address] [place] [address] [phone] (phone)  [phone] (fax)           History of Present Illness:   H&P was previously reviewed.       Administered Medications: As per Anesthesiology Record     Indications: Abdominal pain: 789.00 - R10.9  Procedure:   The procedure, indications, preparation and potential complications were  explained to the patient, who indicated understanding and signed the  corresponding consent forms. MAC was administered by anesthesiologist.  Continuous pulse oximetry and blood pressure monitoring were used throughout the  procedure. Supplemental oxygen was used. Patient was placed in the left lateral  decubitus position. The endoscope was introduced through the mouth and advanced  under direct visualization until the second part of the duodenum was reached.  Patient tolerance to the procedure was good. The procedure was not difficult.  Blood loss was minimal.      Limitations/Complications: There were no apparent limitations or complications  Findings:   Esophagus Mucosa Normal mucosa was noted in the whole esophagus.   Stomach Mucosa Diffuse erythema of the mucosa was noted in the stomach. These  findings are compatible with non-erosive gastritis. Multiple cold forceps  biopsies were performed for histology.   Duodenum Mucosa Normal mucosa was noted in the whole examined duodenum. Random  mucosal biopsies were obtained to evaluate for histologic features of celiac  disease. Multiple cold forceps biopsies were performed for histology in the  second part of the duodenum.                          Impressions:    Normal mucosa in the whole esophagus.    Erythema in the stomach compatible with non-erosive gastritis. (Biopsy).    Normal mucosa in the whole examined duodenum. (Biopsy).     Plan: Advance diet as tolerated  Protonix 40 mg po daily  Follow-up visit in 2-4 weeks in the GI [facility] clinic    Attending Participation: I was present and participated during the entire  procedure, including non-key portions.       Gopal Torres MD    Version 1, Electronically signed on 2020 12:45:34 PM by Gopal Torres MD    < end of copied text >

## 2020-09-18 NOTE — DISCHARGE NOTE PROVIDER - CARE PROVIDERS DIRECT ADDRESSES
,DirectAddress_Unknown ,DirectAddress_Unknown,[email or web address]

## 2020-09-18 NOTE — CHART NOTE - NSCHARTNOTEFT_GEN_A_CORE
`<<<RESIDENT DISCHARGE NOTE>>>     SAI ANN  MRN-[id number]    VITAL SIGNS:  T(F): 98 (09-18-20 @ 13:05), Max: 98 (09-18-20 @ 13:05)  HR: 70 (09-18-20 @ 13:05)  BP: 106/54 (09-18-20 @ 13:05)  SpO2: --      PHYSICAL EXAMINATION:  General: Well appearing, no acute distress  Head & Neck: Normocephalic, atraumatic  Pulmonary: Lungs clear to auscultation bilaterally, no wheezing ronchi, rales  Cardiovascular: Regular rate, normal rhythm, S1&S2 auscultated  Gastrointestinal/Abdomen & Pelvis: Soft, nontender, nondistended, (+) bowel sounds  Neurologic/Motor: CN II-XII grossly intact, AAOx4    TEST RESULTS:                        12.0   6.13  )-----------( 219      ( 18 Sep 2020 05:55 )             35.2       09-18    136  |  103  |  9<L>  ----------------------------<  64<L>  4.1   |  21  |  0.6<L>    Ca    8.3<L>      18 Sep 2020 05:55        FINAL DISCHARGE INTERVIEW:  Resident(s) Present: Florian MANCILLA    DISCHARGE MEDICATION RECONCILIATION  reviewed with Attending Barb MANCILLA    DISPOSITION:   [x] Home,    [  ] Home with Visiting Nursing Services,   [    ]  SNF/ NH,    [   ] Acute Rehab (4A),   [   ] Other (Specify:_________)

## 2020-09-18 NOTE — PROGRESS NOTE ADULT - ASSESSMENT
Mrs. Morales is a 44 year old F with a PMHx significant for acute  pancreatitis, SLE and hypothyroidism presents for a 6 days history of worsening abdominal pain.  On Thursday, 9/10/2020, patient reported excruciating burning, sharp pain localized to her epigastric area that radiated to the left side and back.     Abdominal pain/ Dyspepsia  GERD/PUD  - Normal LFTs  - Hemodynamically stable   - CBC and CMP unremarkable   - CT Abdomen and Pelvis w/ IV Cont: Hepatic steatosis. No CT evidence of acute abdominopelvic pathology. 3.7 cm unilocular left adnexal cyst. No radiographic evidence of acute pancreatitis   - US abdomen: Unremarkable right upper quadrant abdominal ultrasound. Normal CBD  - EGD showed erythema in the stomach compatible with non-erosive gastritis  - Awaiting results for MRI/MRCP      Plan   - Advance diet as tolerated   - Enema TID   - MiraLAX TID   - Senna 3 Tablets qhs   - If pt doesn't feel better or MRI is inconclusive, consider Colonoscopy on Monday 9/21/2020  - PPI bid  - Pain control    Mrs. Morales is a 44 year old F with a PMHx significant for acute  pancreatitis, SLE and hypothyroidism presents for a 6 days history of worsening abdominal pain.  On Thursday, 9/10/2020, patient reported excruciating burning, sharp pain localized to her epigastric area that radiated to the left side and back.     Abdominal pain/ Dyspepsia  GERD/PUD  - Normal LFTs  - Hemodynamically stable   - CBC and CMP unremarkable   - CT Abdomen and Pelvis w/ IV Cont: Hepatic steatosis. No CT evidence of acute abdominopelvic pathology. 3.7 cm unilocular left adnexal cyst. No radiographic evidence of acute pancreatitis   - US abdomen: Unremarkable right upper quadrant abdominal ultrasound. Normal CBD  - EGD showed erythema in the stomach compatible with non-erosive gastritis  - Awaiting results for MRI/MRCP.        Plan   - Advance diet as tolerated   - Enema TID   - MiraLAX TID   - Senna 3 Tablets qhs   - If pt doesn't feel better and MRI is inconclusive, consider Colonoscopy on Monday 9/21/2020  - PPI bid  - Pain control

## 2020-09-18 NOTE — DISCHARGE NOTE PROVIDER - NSDCMRMEDTOKEN_GEN_ALL_CORE_FT
dicyclomine 20 mg oral tablet: 1 tab(s) orally 3 times a day  Fioricet oral capsule: 1 cap(s) orally every 4 hours  phentermine 37.5 mg oral capsule: 1 cap(s) orally once a day  Plaquenil 200 mg oral tablet: orally 2 times a day  predniSONE 20 mg oral tablet: 2 tab(s) orally once a day  Synthroid 100 mcg (0.1 mg) oral tablet: 1 tab(s) orally once a day  Vitamin D3 2000 intl units (50 mcg) oral capsule: orally once a day  Zofran 4 mg oral tablet: 1 tab(s) orally every 6 hours  PRN   dicyclomine 20 mg oral tablet: 1 tab(s) orally 3 times a day  Fioricet oral capsule: 1 cap(s) orally every 4 hours, As Needed for pain  phentermine 37.5 mg oral capsule: 1 cap(s) orally once a day  Plaquenil 200 mg oral tablet: orally 2 times a day  predniSONE 20 mg oral tablet: 2 tab(s) orally once a day  Synthroid 100 mcg (0.1 mg) oral tablet: 1 tab(s) orally once a day  Vitamin D3 2000 intl units (50 mcg) oral capsule: orally once a day  Zofran 4 mg oral tablet: 1 tab(s) orally every 6 hours  PRN

## 2020-09-18 NOTE — DISCHARGE NOTE PROVIDER - HOSPITAL COURSE
HPI:  Mrs. Morales is a 44 year old F with a PMHx significant for SLE and hypothyroidism presents for a 5 day history of worsening abdominal pain.  On Thursday, 9/10/2020, patient reported excruciating burning, sharp-like, pain localized to her epigastric area that radiated to her left flank pain.  She said that 30 min prior to the pain onset, she had chicken [employer].  She said alleviating factors and worsening factor comes with avoidance of food and eating respectively.  She had a previous episode of this pain, in which she was hospitalized at [facility], where they diagnosed her with acute pancreatitis (8/3/2020-8/8/2020), where she was treated with IV fluids and was subsequently discharged.  Her lipase at [facility] was over 2000 as per patient (records from [facility] did not have a recorded lipase). Outpatient labs on  8/14/2020 showed a lipase of 127.  Her rheumatologist thought her pancreatitis may have been attributable to AZT and started her on prednisone at around the end of August and discontinued AZY.  Patient reported alleviation of pain prior to presentation when she started her steroids. She endorsed two episodes of non-bloody, watery diarrhea.  She did not endorse chest pain, sob, n/v, HA, dizziness, fevers, rigors.     In the ED: Temp 98, /57, HR 81.  CBC and CMP unremarkable.  CTAP negative for pancreatitis, demonstrated a 3.7cm left adnexal cyst and hepatic steatosis.    (16 Sep 2020 01:43)    She was admitted to medicine for further workup of abdominal pain. She underwent EGD which confirmed a diagnosis of gastritis. MRCP was unremarkable. Patient was treated with protonix and improved clinically. She was found to have a UTI which was treated with ceftriaxone. She was discharged to home.   Mrs. Morales is a 44 year old F with a PMHx significant for SLE and hypothyroidism presents for a 5 day history of worsening abdominal pain.  On Thursday, 9/10/2020, patient reported excruciating burning, sharp-like, pain localized to her epigastric area that radiated to her left flank pain.  She said that 30 min prior to the pain onset, she had chicken [employer]e.  She said alleviating factors and worsening factor comes with avoidance of food and eating respectively.  She had a previous episode of this pain, in which she was hospitalized at [facility], where they diagnosed her with acute pancreatitis (8/3/2020-8/8/2020), where she was treated with IV fluids and was subsequently discharged.  Her lipase at [facility] was over 2000 as per patient (records from [facility] did not have a recorded lipase). Outpatient labs on  8/14/2020 showed a lipase of 127.  Her rheumatologist thought her pancreatitis may have been attributable to AZT and started her on prednisone at around the end of August and discontinued AZY.  Patient reported alleviation of pain prior to presentation when she started her steroids. She endorsed two episodes of non-bloody, watery diarrhea.  She did not endorse chest pain, sob, n/v, HA, dizziness, fevers, rigors.     In the ED: Temp 98, /57, HR 81.  CBC and CMP unremarkable.  CTAP negative for pancreatitis, demonstrated a 3.7cm left adnexal cyst and hepatic steatosis.    (16 Sep 2020 01:43)    She was admitted to medicine for further workup of abdominal pain. She underwent EGD which confirmed a diagnosis of gastritis. MRCP was unremarkable. Patient was treated with protonix and improved clinically. She was found to have a UTI which was treated with ceftriaxone. She was discharged to home.     #Gastritis  - CT Abdomen and Pelvis w/ IV Cont (09.15.20 @ 15:25) >No CT evidence of acute abdominopelvic pathology.  3.7 cm unilocular left adnexal cyst.  - US Abdomen Limited (09.15.20 @ 12:23) >Unremarkable right upper quadrant abdominal ultrasound.  -Lipase, Serum: 38 U/L (09.15.20 @ 12:15)  -  EGD (09.17.20 @ 12:30) >Erythema in the stomach compatible with non-erosive gastritis. (Biopsy). Normal mucosa in the whole examined duodenum. (Biopsy).   - MR Abdomen No Cont (09.17.20 @ 20:07) >No definite evidence of choledocholithiasis noting MRCP images are degraded by motion. No biliary ductal dilatation. Hepatic steatosis.  - Protonix 40 mg po daily  - Follow-up visit in 2-4 weeks in the [facility] clinic    # Cystitis  - c/w vantin    # H/o lupus  - c/w prednisone, hydroxychloroquine    # Hypothyroidism  - c/w synthroid

## 2020-09-18 NOTE — DISCHARGE NOTE NURSING/CASE MANAGEMENT/SOCIAL WORK - PATIENT PORTAL LINK FT
You can access the FollowMyHealth Patient Portal offered by [facility] by registering at the following website: [email or web address]. By joining [facility]’s FollowMyHealth portal, you will also be able to view your health information using other applications (apps) compatible with our system.

## 2020-09-18 NOTE — DISCHARGE NOTE PROVIDER - PROVIDER TOKENS
PROVIDER:[TOKEN:[[id number]],FOLLOWUP:[1 week]] PROVIDER:[TOKEN:[[id number]],FOLLOWUP:[1 week]],PROVIDER:[TOKEN:[[id number]],FOLLOWUP:[2 weeks]]

## 2020-09-23 DIAGNOSIS — E03.9 HYPOTHYROIDISM, UNSPECIFIED: ICD-10-CM

## 2020-09-23 DIAGNOSIS — K76.0 FATTY (CHANGE OF) LIVER, NOT ELSEWHERE CLASSIFIED: ICD-10-CM
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
## 2020-09-23 DIAGNOSIS — T38.0X5A ADVERSE EFFECT OF GLUCOCORTICOIDS AND SYNTHETIC ANALOGUES, INITIAL ENCOUNTER: ICD-10-CM

## 2020-09-23 DIAGNOSIS — K21.9 GASTRO-ESOPHAGEAL REFLUX DISEASE WITHOUT ESOPHAGITIS: ICD-10-CM

## 2020-09-23 DIAGNOSIS — M32.9 SYSTEMIC LUPUS ERYTHEMATOSUS, UNSPECIFIED: ICD-10-CM

## 2020-09-23 DIAGNOSIS — K29.70 GASTRITIS, UNSPECIFIED, WITHOUT BLEEDING: ICD-10-CM

## 2020-09-23 DIAGNOSIS — K86.1 OTHER CHRONIC PANCREATITIS: ICD-10-CM

## 2020-09-23 DIAGNOSIS — N39.0 URINARY TRACT INFECTION, SITE NOT SPECIFIED: ICD-10-CM

## 2025-05-05 PROBLEM — Z00.00 ENCOUNTER FOR PREVENTIVE HEALTH EXAMINATION: Status: ACTIVE | Noted: 2025-05-05

## 2025-05-14 ENCOUNTER — APPOINTMENT (OUTPATIENT)
Age: 50
End: 2025-05-14
Payer: COMMERCIAL

## 2025-05-14 PROCEDURE — 99213 OFFICE O/P EST LOW 20 MIN: CPT

## 2025-05-19 ENCOUNTER — APPOINTMENT (OUTPATIENT)
Age: 50
End: 2025-05-19
Payer: COMMERCIAL

## 2025-05-19 DIAGNOSIS — G57.91 UNSPECIFIED MONONEUROPATHY OF RIGHT LOWER LIMB: ICD-10-CM

## 2025-05-19 PROCEDURE — 99213 OFFICE O/P EST LOW 20 MIN: CPT

## 2025-05-19 RX ORDER — CELECOXIB 200 MG/1
200 CAPSULE ORAL
Qty: 60 | Refills: 0 | Status: ACTIVE | COMMUNITY
Start: 2025-05-19 | End: 1900-01-01

## 2025-06-11 ENCOUNTER — APPOINTMENT (OUTPATIENT)
Age: 50
End: 2025-06-11